# Patient Record
Sex: MALE | Race: WHITE | Employment: OTHER | URBAN - METROPOLITAN AREA
[De-identification: names, ages, dates, MRNs, and addresses within clinical notes are randomized per-mention and may not be internally consistent; named-entity substitution may affect disease eponyms.]

---

## 2017-08-21 ENCOUNTER — OFFICE VISIT (OUTPATIENT)
Dept: FAMILY MEDICINE CLINIC | Age: 82
End: 2017-08-21

## 2017-08-21 VITALS
SYSTOLIC BLOOD PRESSURE: 130 MMHG | HEIGHT: 70 IN | WEIGHT: 172 LBS | DIASTOLIC BLOOD PRESSURE: 74 MMHG | TEMPERATURE: 98.5 F | BODY MASS INDEX: 24.62 KG/M2

## 2017-08-21 DIAGNOSIS — L03.811 CELLULITIS OF HEAD EXCEPT FACE: ICD-10-CM

## 2017-08-21 PROCEDURE — 99213 OFFICE O/P EST LOW 20 MIN: CPT | Performed by: INTERNAL MEDICINE

## 2017-08-21 RX ORDER — CEPHALEXIN 500 MG/1
500 CAPSULE ORAL 3 TIMES DAILY
Qty: 30 CAPSULE | Refills: 0 | Status: SHIPPED | OUTPATIENT
Start: 2017-08-21 | End: 2022-04-08

## 2017-08-21 ASSESSMENT — PATIENT HEALTH QUESTIONNAIRE - PHQ9
2. FEELING DOWN, DEPRESSED OR HOPELESS: 0
SUM OF ALL RESPONSES TO PHQ9 QUESTIONS 1 & 2: 0
SUM OF ALL RESPONSES TO PHQ QUESTIONS 1-9: 0
1. LITTLE INTEREST OR PLEASURE IN DOING THINGS: 0

## 2017-08-21 ASSESSMENT — ENCOUNTER SYMPTOMS
ABDOMINAL PAIN: 0
SHORTNESS OF BREATH: 0
COUGH: 0
APNEA: 0

## 2022-04-08 ENCOUNTER — APPOINTMENT (OUTPATIENT)
Dept: CT IMAGING | Age: 87
End: 2022-04-08
Payer: MEDICARE

## 2022-04-08 ENCOUNTER — HOSPITAL ENCOUNTER (EMERGENCY)
Age: 87
Discharge: HOME | End: 2022-04-08
Attending: EMERGENCY MEDICINE
Payer: MEDICARE

## 2022-04-08 VITALS
OXYGEN SATURATION: 99 % | HEIGHT: 70 IN | BODY MASS INDEX: 23.41 KG/M2 | DIASTOLIC BLOOD PRESSURE: 84 MMHG | TEMPERATURE: 98.5 F | HEART RATE: 88 BPM | SYSTOLIC BLOOD PRESSURE: 206 MMHG | RESPIRATION RATE: 18 BRPM | WEIGHT: 163.5 LBS

## 2022-04-08 DIAGNOSIS — S09.90XA CLOSED HEAD INJURY, INITIAL ENCOUNTER: Primary | ICD-10-CM
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
## 2022-04-08 DIAGNOSIS — S51.011A SKIN TEAR OF RIGHT ELBOW WITHOUT COMPLICATION, INITIAL ENCOUNTER: ICD-10-CM

## 2022-04-08 PROCEDURE — 70450 CT HEAD/BRAIN W/O DYE: CPT

## 2022-04-08 PROCEDURE — 99284 EMERGENCY DEPT VISIT MOD MDM: CPT

## 2022-04-08 ASSESSMENT — PAIN DESCRIPTION - LOCATION: LOCATION: ARM

## 2022-04-08 ASSESSMENT — PAIN DESCRIPTION - PAIN TYPE: TYPE: ACUTE PAIN

## 2022-04-08 ASSESSMENT — PAIN DESCRIPTION - ORIENTATION: ORIENTATION: RIGHT

## 2022-04-08 ASSESSMENT — PAIN DESCRIPTION - FREQUENCY: FREQUENCY: CONTINUOUS

## 2022-04-08 ASSESSMENT — PAIN SCALES - GENERAL
PAINLEVEL_OUTOF10: 0
PAINLEVEL_OUTOF10: 3

## 2022-04-08 ASSESSMENT — PAIN - FUNCTIONAL ASSESSMENT
PAIN_FUNCTIONAL_ASSESSMENT: ACTIVITIES ARE NOT PREVENTED
PAIN_FUNCTIONAL_ASSESSMENT: 0-10
PAIN_FUNCTIONAL_ASSESSMENT: 0-10

## 2022-04-08 ASSESSMENT — PAIN DESCRIPTION - DESCRIPTORS: DESCRIPTORS: TENDER

## 2022-04-08 ASSESSMENT — PAIN DESCRIPTION - PROGRESSION: CLINICAL_PROGRESSION: NOT CHANGED

## 2022-04-08 NOTE — ED PROVIDER NOTES
Emergency Physician Note        Note Open Time: 7:08 PM EDT    Chief Complaint  Laceration (Patient lost his balance at home and sustained 2 large skin tears on his right arm. Also hit his head and has a small abrasion on the right fore head. No LOC. AMbulatory with strong gait using his walker.)       History of Present Illness  Elpidio Chang is a 80 y.o. male who presents to the ED for laceration. Patient reports that he had a slip and fall at home with no loss of consciousness. He did strike his head. He also injured his right arm. He has no particular pain complaints. Tetanus is up-to-date. He does have a large skin tear of his right arm which she wanted to have evaluated and treated. 10 systems reviewed, pertinent positives per HPI otherwise noted to be negative    I have reviewed the following from the nursing documentation:      Prior to Admission medications    Medication Sig Start Date End Date Taking? Authorizing Provider   tamsulosin (FLOMAX) 0.4 MG capsule Take 0.4 mg by mouth daily. Historical Provider, MD   lisinopril (PRINIVIL;ZESTRIL) 20 MG tablet Take 20 mg by mouth daily. Historical Provider, MD   simvastatin (ZOCOR) 80 MG tablet Take 1/2 (one half) by mouth daily    Historical Provider, MD   metoprolol (LOPRESSOR) 25 MG tablet Take 25 mg by mouth 2 times daily. Historical Provider, MD   hydrochlorothiazide (HYDRODIURIL) 25 MG tablet Take 25 mg by mouth daily.       Historical Provider, MD   aspirin 81 MG EC tablet Take 2 by mouth daily    Historical Provider, MD       Allergies as of 04/08/2022    (No Known Allergies)       Past Medical History:   Diagnosis Date    Hyperlipidemia     Hypertension         Surgical History:   Past Surgical History:   Procedure Laterality Date    CATARACT REMOVAL  2010    bilateral/Dr. Desai Miss    CYST REMOVAL      knee    JOINT REPLACEMENT      right knee    KNEE ARTHROSCOPY      right    PRE-MALIGNANT / BENIGN SKIN LESION EXCISION  2010    benign pilomatrixoma- chest wall        Family History:    Family History   Problem Relation Age of Onset    Diabetes Mother     Stroke Mother     Heart Disease Sister     Heart Disease Brother     Stroke Brother        Social History     Socioeconomic History    Marital status:      Spouse name: Not on file    Number of children: Not on file    Years of education: Not on file    Highest education level: Not on file   Occupational History    Not on file   Tobacco Use    Smoking status: Former Smoker     Types: Cigarettes    Smokeless tobacco: Never Used   Substance and Sexual Activity    Alcohol use: Yes     Comment: occasional use    Drug use: No    Sexual activity: Not on file   Other Topics Concern    Not on file   Social History Narrative    Not on file     Social Determinants of Health     Financial Resource Strain:     Difficulty of Paying Living Expenses: Not on file   Food Insecurity:     Worried About [address] in the Last Year: Not on file    [address] [employer] in the Last Year: Not on file   Transportation Needs:     Lack of Transportation (Medical): Not on file    Lack of Transportation (Non-Medical):  Not on file   Physical Activity:     Days of Exercise per Week: Not on file    Minutes of Exercise per Session: Not on file   Stress:     Feeling of Stress : Not on file   Social Connections:     Frequency of Communication with Friends and Family: Not on file    Frequency of Social Gatherings with Friends and Family: Not on file    Attends Sabianist Services: Not on file    Active Member of Clubs or Organizations: Not on file    Attends Club or Organization Meetings: Not on file    Marital Status: Not on file   Intimate Partner Violence:     Fear of Current or Ex-Partner: Not on file    Emotionally Abused: Not on file    Physically Abused: Not on file    Sexually Abused: Not on file   Housing Stability:     Unable to Pay for Housing in the Last Year: Not on file    Number of Places Lived in the Last Year: Not on file    Unstable Housing in the Last Year: Not on file       Nursing notes reviewed. ED Triage Vitals [04/08/22 1845]   Enc Vitals Group      BP (!) 213/75      Pulse 87      Resp 19      Temp 98.5 °F (36.9 °C)      Temp Source Tympanic      SpO2 100 %      Weight 163 lb 8 oz (74.2 kg)      Height 5' 10\" (1.778 m)      Head Circumference       Peak Flow       Pain Score       Pain Loc       Pain Edu? Excl. in [address]? GENERAL:  Awake, alert. Well developed, well nourished with no apparent distress. HENT:  Normocephalic, right frontal forehead abrasion with contusion noted, no depressed skull fracture, moist mucous membranes. EYES:  Pupils equal round and reactive to light, Conjunctiva normal, extraocular movements normal.  NECK:  No meningeal signs, Supple. No tenderness. CHEST:  Regular rate and rhythm, chest wall non-tender. LUNGS:  Clear to auscultation bilaterally. ABDOMEN:  Soft, non-tender, no rebound, rigidity or guarding, non-distended, normal bowel sounds. No costovertebral angle tenderness to palpation. BACK:  No tenderness. .rmsp  EXTREMITIES:  Normal range of motion, no edema, no bony tenderness, no deformity, distal pulses present. Remainder of axial and appendicular skeleton NT exc as noted. Full active ROM as well at all jts exc as noted. SKIN: Warm, dry and large skin tears right upper extremity from about the mid humerus dorsally and then over the elbow to the mid forearm. NEUROLOGIC: Normal mental status. Moving all extremities to command. RADIOLOGY  X-RAYS:  I have reviewed radiologic plain film image(s). ALL OTHER NON-PLAIN FILM IMAGES SUCH AS CT, ULTRASOUND AND MRI HAVE BEEN READ BY THE RADIOLOGIST.   CT Head WO Contrast    (Results Pending)        MEDICAL DECISION MAKING          7:09 PM: I discussed the history, physical, and treatment plan with Dr. Aruna Lake. Mirna Ledger was signed out in stable condition. Please see Dr. Jonathan Patel note for further details, including diagnosis and disposition. Clinical Impression    1. Closed head injury, initial encounter    2. Skin tear of right elbow without complication, initial encounter    3. Fall from slip, trip, or stumble, initial encounter        Blood pressure (!) 208/91, pulse 87, temperature 98.5 °F (36.9 °C), temperature source Tympanic, resp. rate 19, height 5' 10\" (1.778 m), weight 163 lb 8 oz (74.2 kg), SpO2 100 %. This chart was generated using the [address] [place] [address] dictation system. I created this record but it may contain dictation errors.           Solitario Voss MD  04/08/22 1911

## 2022-04-08 NOTE — ED PROVIDER NOTES
Emergency Department Encounter  Location: [address] [place] [address]    Patient: Teresa Kirk  MRN: [id number]  : 1932  Date of evaluation: 2022  ED Provider: Chika Barbosa MD    7:00p.m. Teresa Kirk was checked out to me by Dr. Idalia Paredes. Please see his/her initial documentation for details of the patient's initial ED presentation, physical exam and completed studies. In brief, Teresa Kirk is a 80 y.o. male that presented to the emergency department with a fall at home. Patient apparently lost his balance and landed on the right arm sustaining multiple laceration and skin tear. He also has a hematoma on his forehead. There was no loss of consciousness. Patient is ambulatory with strong gait using his walker. Has past history of hypertension hyperlipidemia anemia. Patient is alert awake. On exam wise he is has a soft tissue hematoma noted. Pupils are equal and reactive. Moving all extremities well. No deformity. Right arm has extensive large skin tear from the upper arm extending to the forearm. Probably measures about 10 inches. I have reviewed and interpreted all of the currently available lab results and diagnostics from this visit:  No results found for this visit on 22. CT Head WO Contrast    Result Date: 2022  EXAMINATION: CT OF THE HEAD WITHOUT CONTRAST  2022 7:06 pm TECHNIQUE: CT of the head was performed without the administration of intravenous contrast. Dose modulation, iterative reconstruction, and/or weight based adjustment of the mA/kV was utilized to reduce the radiation dose to as low as reasonably achievable. COMPARISON: None. HISTORY: ORDERING SYSTEM PROVIDED HISTORY: head injury TECHNOLOGIST PROVIDED HISTORY: Reason for exam:->head injury Has a \"code stroke\" or \"stroke alert\" been called? ->No Decision Support Exception - unselect if not a suspected or confirmed emergency medical condition->Emergency Medical Condition (MA) Reason for Exam: Fall, head lac to rt forehead, denies loc x 1 hr FINDINGS: BRAIN/VENTRICLES: There is no acute intracranial hemorrhage, mass effect or midline shift. No abnormal extra-axial fluid collection. The gray-white differentiation is maintained without evidence of an acute infarct. There is no evidence of hydrocephalus. Moderate diffuse decrease in cerebral volume is noted with corresponding prominence of the sulci and ventricles. Ill-defined hypoattenuation is noted within cerebral white matter consistent with moderate microvascular ischemic disease. ORBITS: The visualized portion of the orbits demonstrate no acute abnormality. SINUSES: The visualized paranasal sinuses and mastoid air cells demonstrate no acute abnormality. SOFT TISSUES/SKULL:  Anterior right frontal scalp mild soft tissue contusion is present. 1. Anterior right frontal scalp mild focal soft tissue contusion. 2. No associated acute intracranial abnormality. 3. Moderate cerebral white matter chronic microvascular ischemic disease. 4. Moderate diffuse cerebral atrophy. Final ED Course and MDM: In brief, Mirna Alvares is a 80 y.o. male whose care was signed out to me by the outgoing provider. In brief, elderly 80 male who lost his balance and fell hit his head without loss of consciousness. Patient underwent a CT scan which was negative. The skin tear was dressed. It was cleaned. Tetanus status up-to-date. The area was cleaned by the nurse. Dressing applied. Most likely Steri-Strip. CAT scan explained to the patient the results. Has a soft tissue hematoma. Patient reassured and discharged in good condition. Ambulatory. ED Medication Orders (From admission, onward)    None          Final Impression      1. Closed head injury, initial encounter    2. Skin tear of right elbow without complication, initial encounter    3.  Fall from slip, trip, or stumble, initial encounter        DISPOSITION       (Please note that portions of this note may have been completed with a voice recognition program. Efforts were made to edit the dictations but occasionally words are mis-transcribed.)    DANIELE OWUSU MD  [address], [place]  04/08/22 5502

## 2022-04-09 NOTE — ED NOTES
Right arm skin tears/ cleansed w large amounts hibiclens and saline/ tolerated well/ MD Iqbal into apply special dressings/ skin steri stripped and wrapped w 4x4 and coban/ extra supplies and wound care instructions given to pt and family. Bleeding controlled and drsg dry and intact.       Priya Simmons, RN  04/08/22 2121

## 2023-01-11 ENCOUNTER — HOSPITAL ENCOUNTER (OUTPATIENT)
Dept: CT IMAGING | Age: 88
Discharge: HOME | End: 2023-01-11
Payer: MEDICARE

## 2023-01-11 DIAGNOSIS — R10.32 LLQ PAIN: ICD-10-CM

## 2023-01-11 PROCEDURE — 74176 CT ABD & PELVIS W/O CONTRAST: CPT

## 2025-01-15 ENCOUNTER — HOSPITAL ENCOUNTER (INPATIENT)
Age: 89
LOS: 6 days | Discharge: NURSING, REHAB OR LONG-TERM CARE | DRG: 643 | End: 2025-01-22
Attending: EMERGENCY MEDICINE | Admitting: INTERNAL MEDICINE
Payer: MEDICARE

## 2025-01-15 ENCOUNTER — APPOINTMENT (OUTPATIENT)
Dept: GENERAL RADIOLOGY | Age: 89
DRG: 643 | End: 2025-01-15
Payer: MEDICARE

## 2025-01-15 DIAGNOSIS — T68.XXXA HYPOTHERMIA, INITIAL ENCOUNTER: ICD-10-CM

## 2025-01-15 DIAGNOSIS — I50.9 CONGESTIVE HEART FAILURE, UNSPECIFIED HF CHRONICITY, UNSPECIFIED HEART FAILURE TYPE (HCC): ICD-10-CM

## 2025-01-15 DIAGNOSIS — E03.9 MYXEDEMA: Primary | ICD-10-CM

## 2025-01-15 DIAGNOSIS — R79.89 ELEVATED TSH: ICD-10-CM

## 2025-01-15 DIAGNOSIS — E87.1 HYPONATREMIA: ICD-10-CM

## 2025-01-15 LAB
ANION GAP SERPL CALCULATED.3IONS-SCNC: 14 MMOL/L (ref 3–16)
BASE EXCESS BLDV CALC-SCNC: -5.5 MMOL/L (ref -2–3)
BASOPHILS # BLD: 0 K/UL (ref 0–0.2)
BASOPHILS NFR BLD: 0.3 %
BUN SERPL-MCNC: 66 MG/DL (ref 7–20)
CALCIUM SERPL-MCNC: 8.8 MG/DL (ref 8.3–10.6)
CHLORIDE SERPL-SCNC: 100 MMOL/L (ref 99–110)
CO2 BLDV-SCNC: 23 MMOL/L
CO2 SERPL-SCNC: 17 MMOL/L (ref 21–32)
COHGB MFR BLDV: 0.9 % (ref 0–1.5)
CREAT SERPL-MCNC: 2.6 MG/DL (ref 0.8–1.3)
DEPRECATED RDW RBC AUTO: 14.9 % (ref 12.4–15.4)
DO-HGB MFR BLDV: 10.8 %
EOSINOPHIL # BLD: 0 K/UL (ref 0–0.6)
EOSINOPHIL NFR BLD: 0.2 %
GFR SERPLBLD CREATININE-BSD FMLA CKD-EPI: 22 ML/MIN/{1.73_M2}
GLUCOSE SERPL-MCNC: 189 MG/DL (ref 70–99)
HCO3 BLDV-SCNC: 21.4 MMOL/L (ref 24–28)
HCT VFR BLD AUTO: 30.8 % (ref 40.5–52.5)
HGB BLD-MCNC: 10 G/DL (ref 13.5–17.5)
LYMPHOCYTES # BLD: 0.9 K/UL (ref 1–5.1)
LYMPHOCYTES NFR BLD: 9 %
MAGNESIUM SERPL-MCNC: 3.28 MG/DL (ref 1.8–2.4)
MCH RBC QN AUTO: 31.6 PG (ref 26–34)
MCHC RBC AUTO-ENTMCNC: 32.6 G/DL (ref 31–36)
MCV RBC AUTO: 97 FL (ref 80–100)
METHGB MFR BLDV: 0.3 % (ref 0–1.5)
MONOCYTES # BLD: 0.7 K/UL (ref 0–1.3)
MONOCYTES NFR BLD: 7.2 %
NEUTROPHILS # BLD: 8.1 K/UL (ref 1.7–7.7)
NEUTROPHILS NFR BLD: 83.3 %
NT-PROBNP SERPL-MCNC: 2100 PG/ML (ref 0–449)
PCO2 BLDV: 46.9 MMHG (ref 41–51)
PH BLDV: 7.27 [PH] (ref 7.35–7.45)
PHOSPHATE SERPL-MCNC: 5.2 MG/DL (ref 2.5–4.9)
PLATELET # BLD AUTO: 209 K/UL (ref 135–450)
PMV BLD AUTO: 9.9 FL (ref 5–10.5)
PO2 BLDV: 63.2 MMHG (ref 25–40)
POTASSIUM SERPL-SCNC: ABNORMAL MMOL/L (ref 3.5–5.1)
RBC # BLD AUTO: 3.17 M/UL (ref 4.2–5.9)
SAO2 % BLDV: 89 %
SODIUM SERPL-SCNC: 131 MMOL/L (ref 136–145)
TROPONIN, HIGH SENSITIVITY: 70 NG/L (ref 0–22)
TSH SERPL DL<=0.005 MIU/L-ACNC: 16 UIU/ML (ref 0.27–4.2)
WBC # BLD AUTO: 9.7 K/UL (ref 4–11)

## 2025-01-15 PROCEDURE — 82533 TOTAL CORTISOL: CPT

## 2025-01-15 PROCEDURE — 84145 PROCALCITONIN (PCT): CPT

## 2025-01-15 PROCEDURE — 2580000003 HC RX 258: Performed by: EMERGENCY MEDICINE

## 2025-01-15 PROCEDURE — 99285 EMERGENCY DEPT VISIT HI MDM: CPT

## 2025-01-15 PROCEDURE — 84132 ASSAY OF SERUM POTASSIUM: CPT

## 2025-01-15 PROCEDURE — 82803 BLOOD GASES ANY COMBINATION: CPT

## 2025-01-15 PROCEDURE — 83605 ASSAY OF LACTIC ACID: CPT

## 2025-01-15 PROCEDURE — 84484 ASSAY OF TROPONIN QUANT: CPT

## 2025-01-15 PROCEDURE — 36415 COLL VENOUS BLD VENIPUNCTURE: CPT

## 2025-01-15 PROCEDURE — 71045 X-RAY EXAM CHEST 1 VIEW: CPT

## 2025-01-15 PROCEDURE — 84100 ASSAY OF PHOSPHORUS: CPT

## 2025-01-15 PROCEDURE — 84439 ASSAY OF FREE THYROXINE: CPT

## 2025-01-15 PROCEDURE — 87040 BLOOD CULTURE FOR BACTERIA: CPT

## 2025-01-15 PROCEDURE — 80048 BASIC METABOLIC PNL TOTAL CA: CPT

## 2025-01-15 PROCEDURE — 85025 COMPLETE CBC W/AUTO DIFF WBC: CPT

## 2025-01-15 PROCEDURE — 83880 ASSAY OF NATRIURETIC PEPTIDE: CPT

## 2025-01-15 PROCEDURE — 81001 URINALYSIS AUTO W/SCOPE: CPT

## 2025-01-15 PROCEDURE — 93005 ELECTROCARDIOGRAM TRACING: CPT | Performed by: EMERGENCY MEDICINE

## 2025-01-15 PROCEDURE — 84443 ASSAY THYROID STIM HORMONE: CPT

## 2025-01-15 PROCEDURE — 83735 ASSAY OF MAGNESIUM: CPT

## 2025-01-15 RX ORDER — LUBIPROSTONE 24 UG/1
24 CAPSULE ORAL EVERY MORNING
Status: ON HOLD | COMMUNITY
Start: 2024-12-03 | End: 2025-01-22 | Stop reason: HOSPADM

## 2025-01-15 RX ORDER — ACETAMINOPHEN 325 MG/1
650 TABLET ORAL EVERY 6 HOURS PRN
COMMUNITY
Start: 2024-12-03

## 2025-01-15 RX ORDER — 0.9 % SODIUM CHLORIDE 0.9 %
500 INTRAVENOUS SOLUTION INTRAVENOUS ONCE
Status: COMPLETED | OUTPATIENT
Start: 2025-01-15 | End: 2025-01-16

## 2025-01-15 RX ORDER — 0.9 % SODIUM CHLORIDE 0.9 %
1000 INTRAVENOUS SOLUTION INTRAVENOUS ONCE
Status: COMPLETED | OUTPATIENT
Start: 2025-01-15 | End: 2025-01-16

## 2025-01-15 RX ORDER — LIOTHYRONINE SODIUM 10 UG/ML
5 INJECTION, SOLUTION INTRAVENOUS ONCE
Status: COMPLETED | OUTPATIENT
Start: 2025-01-16 | End: 2025-01-16

## 2025-01-15 RX ORDER — LANOLIN ALCOHOL/MO/W.PET/CERES
1000 CREAM (GRAM) TOPICAL DAILY
COMMUNITY
Start: 2024-12-04

## 2025-01-15 RX ORDER — SODIUM CHLORIDE 9 MG/ML
5 INJECTION, SOLUTION INTRAMUSCULAR; INTRAVENOUS; SUBCUTANEOUS ONCE
Status: COMPLETED | OUTPATIENT
Start: 2025-01-16 | End: 2025-01-16

## 2025-01-15 RX ORDER — METOPROLOL SUCCINATE 50 MG/1
50 TABLET, EXTENDED RELEASE ORAL DAILY
Status: ON HOLD | COMMUNITY
Start: 2025-01-06 | End: 2025-01-22 | Stop reason: HOSPADM

## 2025-01-15 RX ORDER — ATORVASTATIN CALCIUM 40 MG/1
40 TABLET, FILM COATED ORAL NIGHTLY
Status: ON HOLD | COMMUNITY
Start: 2024-12-03 | End: 2025-01-22 | Stop reason: HOSPADM

## 2025-01-15 RX ORDER — LOPERAMIDE HYDROCHLORIDE 2 MG/1
2 CAPSULE ORAL 4 TIMES DAILY PRN
Status: ON HOLD | COMMUNITY
End: 2025-01-22 | Stop reason: HOSPADM

## 2025-01-15 RX ORDER — LEVOTHYROXINE SODIUM ANHYDROUS 100 UG/5ML
200 INJECTION, POWDER, LYOPHILIZED, FOR SOLUTION INTRAVENOUS ONCE
Status: COMPLETED | OUTPATIENT
Start: 2025-01-16 | End: 2025-01-16

## 2025-01-15 RX ORDER — NIFEDIPINE 30 MG/1
30 TABLET, EXTENDED RELEASE ORAL NIGHTLY
COMMUNITY
Start: 2024-12-04

## 2025-01-15 RX ORDER — AMIODARONE HYDROCHLORIDE 100 MG/1
100 TABLET ORAL DAILY
Status: ON HOLD | COMMUNITY
Start: 2024-12-04 | End: 2025-01-22 | Stop reason: HOSPADM

## 2025-01-15 RX ADMIN — SODIUM CHLORIDE 500 ML: 9 INJECTION, SOLUTION INTRAVENOUS at 23:15

## 2025-01-15 ASSESSMENT — LIFESTYLE VARIABLES
HOW MANY STANDARD DRINKS CONTAINING ALCOHOL DO YOU HAVE ON A TYPICAL DAY: PATIENT DOES NOT DRINK
HOW OFTEN DO YOU HAVE A DRINK CONTAINING ALCOHOL: NEVER

## 2025-01-15 ASSESSMENT — PAIN - FUNCTIONAL ASSESSMENT: PAIN_FUNCTIONAL_ASSESSMENT: NONE - DENIES PAIN

## 2025-01-16 PROBLEM — E03.5 HYPOTHYROID COMA (HCC): Status: ACTIVE | Noted: 2025-01-16

## 2025-01-16 LAB
BASOPHILS # BLD: 0 K/UL (ref 0–0.2)
BASOPHILS NFR BLD: 0.1 %
BILIRUB UR QL STRIP.AUTO: NEGATIVE
CLARITY UR: CLEAR
COLOR UR: YELLOW
CORTIS SERPL-MCNC: 17.2 UG/DL
DEPRECATED RDW RBC AUTO: 14.6 % (ref 12.4–15.4)
EKG DIAGNOSIS: NORMAL
EKG Q-T INTERVAL: 564 MS
EKG QRS DURATION: 158 MS
EKG QTC CALCULATION (BAZETT): 529 MS
EKG R AXIS: 77 DEGREES
EKG T AXIS: -18 DEGREES
EKG VENTRICULAR RATE: 53 BPM
EOSINOPHIL # BLD: 0 K/UL (ref 0–0.6)
EOSINOPHIL NFR BLD: 0.1 %
GLUCOSE UR STRIP.AUTO-MCNC: NEGATIVE MG/DL
HCT VFR BLD AUTO: 25.9 % (ref 40.5–52.5)
HGB BLD-MCNC: 8.6 G/DL (ref 13.5–17.5)
HGB UR QL STRIP.AUTO: NEGATIVE
KETONES UR STRIP.AUTO-MCNC: NEGATIVE MG/DL
LACTATE BLDV-SCNC: 1.2 MMOL/L (ref 0.4–2)
LACTATE BLDV-SCNC: 3.8 MMOL/L (ref 0.4–2)
LEUKOCYTE ESTERASE UR QL STRIP.AUTO: NEGATIVE
LYMPHOCYTES # BLD: 0.4 K/UL (ref 1–5.1)
LYMPHOCYTES NFR BLD: 5.1 %
MCH RBC QN AUTO: 31.7 PG (ref 26–34)
MCHC RBC AUTO-ENTMCNC: 33 G/DL (ref 31–36)
MCV RBC AUTO: 95.9 FL (ref 80–100)
MONOCYTES # BLD: 0.3 K/UL (ref 0–1.3)
MONOCYTES NFR BLD: 4.7 %
NEUTROPHILS # BLD: 6.6 K/UL (ref 1.7–7.7)
NEUTROPHILS NFR BLD: 90 %
NITRITE UR QL STRIP.AUTO: NEGATIVE
PH UR STRIP.AUTO: 6 [PH] (ref 5–8)
PLATELET # BLD AUTO: 164 K/UL (ref 135–450)
PMV BLD AUTO: 9.5 FL (ref 5–10.5)
POTASSIUM SERPL-SCNC: NORMAL MMOL/L (ref 3.5–5.1)
PROCALCITONIN SERPL IA-MCNC: 0.05 NG/ML (ref 0–0.15)
PROT UR STRIP.AUTO-MCNC: 30 MG/DL
RBC # BLD AUTO: 2.7 M/UL (ref 4.2–5.9)
RBC #/AREA URNS HPF: ABNORMAL /HPF (ref 0–4)
SP GR UR STRIP.AUTO: 1.02 (ref 1–1.03)
T4 FREE SERPL-MCNC: 1 NG/DL (ref 0.9–1.8)
TROPONIN, HIGH SENSITIVITY: 68 NG/L (ref 0–22)
UA DIPSTICK W REFLEX MICRO PNL UR: YES
URN SPEC COLLECT METH UR: ABNORMAL
UROBILINOGEN UR STRIP-ACNC: 0.2 E.U./DL
WBC # BLD AUTO: 7.3 K/UL (ref 4–11)
WBC #/AREA URNS HPF: ABNORMAL /HPF (ref 0–5)

## 2025-01-16 PROCEDURE — 85025 COMPLETE CBC W/AUTO DIFF WBC: CPT

## 2025-01-16 PROCEDURE — 51702 INSERT TEMP BLADDER CATH: CPT

## 2025-01-16 PROCEDURE — 92610 EVALUATE SWALLOWING FUNCTION: CPT

## 2025-01-16 PROCEDURE — 6360000002 HC RX W HCPCS: Performed by: EMERGENCY MEDICINE

## 2025-01-16 PROCEDURE — 6360000002 HC RX W HCPCS: Performed by: INTERNAL MEDICINE

## 2025-01-16 PROCEDURE — 2500000003 HC RX 250 WO HCPCS: Performed by: INTERNAL MEDICINE

## 2025-01-16 PROCEDURE — 99221 1ST HOSP IP/OBS SF/LOW 40: CPT | Performed by: NURSE PRACTITIONER

## 2025-01-16 PROCEDURE — 6370000000 HC RX 637 (ALT 250 FOR IP): Performed by: NURSE PRACTITIONER

## 2025-01-16 PROCEDURE — 96374 THER/PROPH/DIAG INJ IV PUSH: CPT

## 2025-01-16 PROCEDURE — 83605 ASSAY OF LACTIC ACID: CPT

## 2025-01-16 PROCEDURE — 1200000000 HC SEMI PRIVATE

## 2025-01-16 PROCEDURE — 6370000000 HC RX 637 (ALT 250 FOR IP): Performed by: INTERNAL MEDICINE

## 2025-01-16 PROCEDURE — 2580000003 HC RX 258: Performed by: INTERNAL MEDICINE

## 2025-01-16 PROCEDURE — 2500000003 HC RX 250 WO HCPCS: Performed by: EMERGENCY MEDICINE

## 2025-01-16 PROCEDURE — 2580000003 HC RX 258: Performed by: EMERGENCY MEDICINE

## 2025-01-16 PROCEDURE — 36415 COLL VENOUS BLD VENIPUNCTURE: CPT

## 2025-01-16 RX ORDER — SODIUM CHLORIDE 0.9 % (FLUSH) 0.9 %
5-40 SYRINGE (ML) INJECTION EVERY 12 HOURS SCHEDULED
Status: DISCONTINUED | OUTPATIENT
Start: 2025-01-16 | End: 2025-01-22 | Stop reason: HOSPADM

## 2025-01-16 RX ORDER — LEVOTHYROXINE SODIUM ANHYDROUS 100 UG/5ML
50 INJECTION, POWDER, LYOPHILIZED, FOR SOLUTION INTRAVENOUS DAILY
Status: DISCONTINUED | OUTPATIENT
Start: 2025-01-16 | End: 2025-01-18

## 2025-01-16 RX ORDER — CASTOR OIL AND BALSAM, PERU 788; 87 MG/G; MG/G
OINTMENT TOPICAL 2 TIMES DAILY
Status: DISCONTINUED | OUTPATIENT
Start: 2025-01-16 | End: 2025-01-22 | Stop reason: HOSPADM

## 2025-01-16 RX ORDER — SODIUM CHLORIDE 0.9 % (FLUSH) 0.9 %
5-40 SYRINGE (ML) INJECTION PRN
Status: DISCONTINUED | OUTPATIENT
Start: 2025-01-16 | End: 2025-01-22 | Stop reason: HOSPADM

## 2025-01-16 RX ORDER — ACETAMINOPHEN 325 MG/1
650 TABLET ORAL EVERY 6 HOURS PRN
Status: DISCONTINUED | OUTPATIENT
Start: 2025-01-16 | End: 2025-01-22 | Stop reason: HOSPADM

## 2025-01-16 RX ORDER — SODIUM CHLORIDE 9 MG/ML
INJECTION, SOLUTION INTRAVENOUS CONTINUOUS
Status: ACTIVE | OUTPATIENT
Start: 2025-01-16 | End: 2025-01-17

## 2025-01-16 RX ORDER — ONDANSETRON 2 MG/ML
4 INJECTION INTRAMUSCULAR; INTRAVENOUS EVERY 6 HOURS PRN
Status: DISCONTINUED | OUTPATIENT
Start: 2025-01-16 | End: 2025-01-22 | Stop reason: HOSPADM

## 2025-01-16 RX ORDER — SODIUM CHLORIDE 9 MG/ML
5 INJECTION, SOLUTION INTRAMUSCULAR; INTRAVENOUS; SUBCUTANEOUS DAILY
Status: DISCONTINUED | OUTPATIENT
Start: 2025-01-16 | End: 2025-01-22 | Stop reason: HOSPADM

## 2025-01-16 RX ORDER — POLYETHYLENE GLYCOL 3350 17 G/17G
17 POWDER, FOR SOLUTION ORAL DAILY PRN
Status: DISCONTINUED | OUTPATIENT
Start: 2025-01-16 | End: 2025-01-22 | Stop reason: HOSPADM

## 2025-01-16 RX ORDER — ONDANSETRON 4 MG/1
4 TABLET, ORALLY DISINTEGRATING ORAL EVERY 8 HOURS PRN
Status: DISCONTINUED | OUTPATIENT
Start: 2025-01-16 | End: 2025-01-22 | Stop reason: HOSPADM

## 2025-01-16 RX ORDER — SODIUM CHLORIDE 9 MG/ML
INJECTION, SOLUTION INTRAVENOUS PRN
Status: DISCONTINUED | OUTPATIENT
Start: 2025-01-16 | End: 2025-01-22 | Stop reason: HOSPADM

## 2025-01-16 RX ORDER — ACETAMINOPHEN 650 MG/1
650 SUPPOSITORY RECTAL EVERY 6 HOURS PRN
Status: DISCONTINUED | OUTPATIENT
Start: 2025-01-16 | End: 2025-01-22 | Stop reason: HOSPADM

## 2025-01-16 RX ORDER — BISACODYL 10 MG
10 SUPPOSITORY, RECTAL RECTAL DAILY
Status: DISCONTINUED | OUTPATIENT
Start: 2025-01-16 | End: 2025-01-22 | Stop reason: HOSPADM

## 2025-01-16 RX ADMIN — SODIUM CHLORIDE, PRESERVATIVE FREE 10 ML: 5 INJECTION INTRAVENOUS at 23:31

## 2025-01-16 RX ADMIN — LEVOTHYROXINE SODIUM ANHYDROUS 50 MCG: 100 INJECTION, POWDER, LYOPHILIZED, FOR SOLUTION INTRAVENOUS at 09:22

## 2025-01-16 RX ADMIN — Medication: at 15:54

## 2025-01-16 RX ADMIN — PIPERACILLIN SODIUM AND TAZOBACTAM SODIUM 4500 MG: 4; .5 INJECTION, SOLUTION INTRAVENOUS at 06:43

## 2025-01-16 RX ADMIN — SODIUM CHLORIDE: 9 INJECTION, SOLUTION INTRAVENOUS at 02:22

## 2025-01-16 RX ADMIN — LIOTHYRONINE SODIUM 5 MCG: 10 INJECTION, SOLUTION INTRAVENOUS at 00:49

## 2025-01-16 RX ADMIN — BISACODYL 10 MG: 10 SUPPOSITORY RECTAL at 10:19

## 2025-01-16 RX ADMIN — SODIUM CHLORIDE, PRESERVATIVE FREE 10 ML: 5 INJECTION INTRAVENOUS at 08:05

## 2025-01-16 RX ADMIN — SODIUM CHLORIDE 5 ML: 9 INJECTION INTRAMUSCULAR; INTRAVENOUS; SUBCUTANEOUS at 00:18

## 2025-01-16 RX ADMIN — Medication: at 23:29

## 2025-01-16 RX ADMIN — SODIUM CHLORIDE, PRESERVATIVE FREE 5 ML: 5 INJECTION INTRAVENOUS at 09:23

## 2025-01-16 RX ADMIN — SODIUM CHLORIDE 25 ML: 9 INJECTION, SOLUTION INTRAVENOUS at 06:42

## 2025-01-16 RX ADMIN — LEVOTHYROXINE SODIUM ANHYDROUS 200 MCG: 100 INJECTION, POWDER, LYOPHILIZED, FOR SOLUTION INTRAVENOUS at 00:18

## 2025-01-16 RX ADMIN — SODIUM CHLORIDE 25 ML: 9 INJECTION, SOLUTION INTRAVENOUS at 23:28

## 2025-01-16 RX ADMIN — SODIUM CHLORIDE: 9 INJECTION, SOLUTION INTRAVENOUS at 15:53

## 2025-01-16 RX ADMIN — PIPERACILLIN AND TAZOBACTAM 3375 MG: 3; .375 INJECTION, POWDER, LYOPHILIZED, FOR SOLUTION INTRAVENOUS at 23:29

## 2025-01-16 RX ADMIN — PIPERACILLIN AND TAZOBACTAM 3375 MG: 3; .375 INJECTION, POWDER, LYOPHILIZED, FOR SOLUTION INTRAVENOUS at 11:59

## 2025-01-16 RX ADMIN — SODIUM CHLORIDE 1000 ML: 0.9 INJECTION, SOLUTION INTRAVENOUS at 00:03

## 2025-01-16 NOTE — CONSULTS
The [facility]  Palliative Medicine Consultation Note      Date Of Admission:1/15/2025  Date of consult: 01/16/25  Seen by PC in the past:  Yes     Recommendations:        Introduced palliative care and had a voluntary discussion about advance care planning to daughter Citlali. Citlali stated her mother, Komal has mild dementia but is able to grasp the situation. Pt has six children, Citlali is the oldest. Citlali states she has HCPOA that she will bring in today. Citlali stated since November 8 when he had the hospitalization for his stroke, patient's overall quality of life has declined. He required acute rehab after his hospitalization and then was sent to extended living facility. Per daughter he was overall improving at the extended living facility but since the past few days he started to decline again. Introduced hospice and comfort care measures, she will talk with the family, reevaluate the situation, and will let us know their decision. Provided emotional support to daughter and will follow up tomorrow. D/w Dr. Garcia.    1. Goals of Care/Advanced Care planning/Code status: DNR-CC, confirmed with daughter today. Family will discuss goals of care and hospice amongst themselves and will f/u tomorrow.  2. Pain: Denies any pain.  3. SOB: Feeling short of breath, currently on 2L NC saturating around 95%.  4. Constipation: Belly distended on physical exam, per daughter pt stated he has been constipated and was started on miralax. Ordered dulcolax suppository and prn enema.  5. Disposition: TBD    Reason for Consult:         [x]  Goals of Care  []  Code Status Discussion/Advanced Care Planning   []  Psychosocial/Family Support  []  Symptom Management  []  Other (Specify)    Requesting Physician: Dr. John Henriquez    CHIEF COMPLAINT:  failure to thrive and AMS     History Obtained From:  chart review     History of Present Illness:         John Berg is a 89 y.o. male with PMH of KONG MARTINEZ CVA in

## 2025-01-16 NOTE — ED NOTES
Patient Name: John Berg  : 1932 89 y.o.  MRN: [id number]  ED Room #: 2TR/2TRA-A2     Chief complaint:   Chief Complaint   Patient presents with    Altered Mental Status     Patient to the Er for altered mental status and shortness of breath. Patient unknown when last known well is per facility this started during the day.     Hospital Problem/Diagnosis:   Hospital Problems             Last Modified POA    * (Principal) Hypothyroid coma (HCC) 2025 Yes         O2 Flow Rate:O2 Device: Nasal cannula O2 Flow Rate (L/min): 4 L/min (if applicable)  Cardiac Rhythm:   (if applicable)  Active LDA's:   Peripheral IV 01/15/25 Left Forearm (Active)       Peripheral IV 01/15/25 Left Antecubital (Active)      Urinary Catheter 01/15/25 Rodriguez-Temperature (Active)   $ Urethral catheter insertion $ Not inserted for procedure 01/15/25 234   Urine Appearance Clear 01/15/25 2347   Collection Container Standard 01/15/25 2347   Securement Method Securing device (Describe) 01/15/25 2347   Catheter Care  Perineal wipes 01/15/25 2347   Catheter Best Practices  Bag below bladder 01/15/25 2347          How does patient ambulate? Unknown, did not assess in the Emergency Department    2. How does patient take pills? Unknown, no oral medications were given in the Emergency Department    3. Is patient alert? Drowsy, but responds to voice    4. Is patient oriented? To Person and To Place    5.   Patient arrived from:  nursing home  Facility Name: ___________________________________________    6. If patient is disoriented or from a Skill Nursing Facility has family been notified of admission? Yes    7. Patient belongings? Belongings: Clothing    Disposition of belongings? Kept with Patient     8. Any specific patient or family belongings/needs/dynamics?   a.     9. Miscellaneous comments/pending orders?  a.       If there are any additional questions please reach out to the Emergency Department.      Kelley Torres,

## 2025-01-16 NOTE — ED PROVIDER NOTES
Social Hx,Allergies, and Family Hx were reviewed.         The patient was given the following medications:  Orders Placed This Encounter   Medications    sodium chloride 0.9 % bolus 500 mL    sodium chloride 0.9 % bolus 1,000 mL    AND Linked Order Group     levothyroxine (SYNTHROID) injection 200 mcg      Order Specific Question:   Is levothyroxine IV being used to treat myxedema coma?      Answer:   Yes     sodium chloride (PF) 0.9 % injection 5 mL    liothyronine (TRIOSTAT) injection 5 mcg    sodium chloride flush 0.9 % injection 5-40 mL    sodium chloride flush 0.9 % injection 5-40 mL    0.9 % sodium chloride infusion    OR Linked Order Group     ondansetron (ZOFRAN-ODT) disintegrating tablet 4 mg     ondansetron (ZOFRAN) injection 4 mg    polyethylene glycol (GLYCOLAX) packet 17 g    OR Linked Order Group     acetaminophen (TYLENOL) tablet 650 mg     acetaminophen (TYLENOL) suppository 650 mg    0.9 % sodium chloride infusion       CONSULTS:  None    Review of Systems     Review of Systems   Unable to perform ROS: Mental status change       Past Medical, Surgical, Family, and Social History     He has a past medical history of Anemia, CAD (coronary artery disease), CKD (chronic kidney disease) stage 3, GFR 30-59 ml/min ([facility]), Enlarged prostate, History of CVA (cerebrovascular accident), Hyperlipidemia, Hypertension, NBCCS (nevoid basal cell carcinoma syndrome), and Osteoarthrosis.  He has a past surgical history that includes cyst removal; pre-malignant / benign skin lesion excision (01/01/2010); Cataract removal (01/01/2010); Knee arthroscopy; joint replacement; and Colonoscopy.  His family history includes Diabetes in his mother; Heart Disease in his brother and sister; Stroke in his brother and mother.  He reports that he has quit smoking. His smoking use included cigarettes. He has never used smokeless tobacco. He reports current alcohol use. He reports that he does not use drugs.    Medications

## 2025-01-16 NOTE — H&P
V2.0  History and Physical      Name:  John Berg /Age/Sex: 1932  (89 y.o. male)   MRN & CSN:  [id number] & [id number] Encounter Date/Time: 2025 3:24 AM EST   Location:  [address] [place] [address] PCP: Taurus Washington MD       Hospital Day: 2    Assessment and Plan:   John Berg is a 89 y.o. 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, hypertension, hyperlipidemia, BPH who was recently discharged from [facility] on 2024 and was living in a nursing home and was sent to ER due to failure to thrive, and acute encephalopathy-in the ER patient was found to be hypotensive, hypothermic, and in acute renal failure with hyponatremia-family was contacted who reaffirmed DNR CC status-however they were amenable to medical management and hence patient was admitted    Acute infectious and metabolic encephalopathy due to hyponatremia, acute renal failure due to prerenal acute kidney injury, hypothermia, bradycardia, elevated TSH with normal free T4-present on admission    Overall constellation of symptoms concerning for sepsis  Chest x-ray did not show any acute pulmonary disease  UA was negative for infection  No other imaging was performed  TSH is 16 with normal free T4  Cortisol is normal  White count is normal  IV Zosyn  IV fluids  Admit to PCU with a bear hugger  Repeat labs in the morning  Family history of from DNR CC status    #2 bradycardia with heart rate between 20s and 40s-family has declined pacemaker or any intervention- was given one dose of epinephrine by EMS    Continue cardiac monitoring for now  Admit to PCU  IV Synthroid 50 mcg daily  He was loaded with IV Synthroid 200 mcg and IV liothyroxine 5 mg 1 dose  Stop beta blockers for now and monitor    #3 acute renal failure due to prerenal TE-present on admission    0.9 normal saline at 75 cc an hour  Avoid any nephrotoxic drugs  Repeat labs tomorrow    #4 hyponatremia with sodium of

## 2025-01-16 NOTE — CONSULTS
Clinical Pharmacy Progress Note  Medication History     Admit Date: 1/15/2025    List of current medications patient is taking is complete. Home Medication list in Epic updated to reflect changes noted below.    Source of information: Orders from Assisted Living facility; [facility] paperwork    Current Outpatient Medications   Medication Instructions    acetaminophen (TYLENOL) 650 mg, Oral, EVERY 6 HOURS PRN    amiodarone (PACERONE) 100 mg, Oral, DAILY    aspirin 81 mg, Oral, DAILY    atorvastatin (LIPITOR) 40 mg, Oral, NIGHTLY    loperamide (IMODIUM) 2 mg, Oral, 4 TIMES DAILY PRN, 2 tabs with 1st stool then 1 tab Q6h PRN diarrhea    lubiprostone (AMITIZA) 24 mcg, Oral, EVERY MORNING    magnesium hydroxide (MILK OF MAGNESIA) 400 MG/5ML suspension 30 mLs, Oral, DAILY, May repeat x 1 daily PRN    metoprolol succinate (TOPROL XL) 50 mg, Oral, DAILY    NIFEdipine (PROCARDIA XL) 30 mg, Oral, NIGHTLY    tamsulosin (FLOMAX) 0.4 mg, DAILY    vitamin B-12 (CYANOCOBALAMIN) 1,000 mcg, Oral, DAILY       Please call with questions--  Sherley Luz, PharmD, BCPS  Wireless: [phone]   1/16/2025 12:09 PM

## 2025-01-17 LAB
ALBUMIN SERPL-MCNC: 3.1 G/DL (ref 3.4–5)
ALBUMIN/GLOB SERPL: 1.5 {RATIO} (ref 1.1–2.2)
ALP SERPL-CCNC: 54 U/L (ref 40–129)
ALT SERPL-CCNC: 32 U/L (ref 10–40)
ANION GAP SERPL CALCULATED.3IONS-SCNC: 11 MMOL/L (ref 3–16)
AST SERPL-CCNC: 14 U/L (ref 15–37)
BASOPHILS # BLD: 0 K/UL (ref 0–0.2)
BASOPHILS NFR BLD: 0.2 %
BILIRUB SERPL-MCNC: 0.3 MG/DL (ref 0–1)
BUN SERPL-MCNC: 68 MG/DL (ref 7–20)
CALCIUM SERPL-MCNC: 8 MG/DL (ref 8.3–10.6)
CHLORIDE SERPL-SCNC: 107 MMOL/L (ref 99–110)
CO2 SERPL-SCNC: 20 MMOL/L (ref 21–32)
CREAT SERPL-MCNC: 3 MG/DL (ref 0.8–1.3)
DEPRECATED RDW RBC AUTO: 14.8 % (ref 12.4–15.4)
EOSINOPHIL # BLD: 0 K/UL (ref 0–0.6)
EOSINOPHIL NFR BLD: 0.1 %
GFR SERPLBLD CREATININE-BSD FMLA CKD-EPI: 19 ML/MIN/{1.73_M2}
GLUCOSE SERPL-MCNC: 80 MG/DL (ref 70–99)
HCT VFR BLD AUTO: 25.8 % (ref 40.5–52.5)
HGB BLD-MCNC: 8.6 G/DL (ref 13.5–17.5)
LYMPHOCYTES # BLD: 0.5 K/UL (ref 1–5.1)
LYMPHOCYTES NFR BLD: 7 %
MCH RBC QN AUTO: 31.8 PG (ref 26–34)
MCHC RBC AUTO-ENTMCNC: 33.2 G/DL (ref 31–36)
MCV RBC AUTO: 95.6 FL (ref 80–100)
MONOCYTES # BLD: 0.5 K/UL (ref 0–1.3)
MONOCYTES NFR BLD: 6.8 %
NEUTROPHILS # BLD: 6.4 K/UL (ref 1.7–7.7)
NEUTROPHILS NFR BLD: 85.9 %
PLATELET # BLD AUTO: 165 K/UL (ref 135–450)
PMV BLD AUTO: 9.8 FL (ref 5–10.5)
POTASSIUM SERPL-SCNC: 5.1 MMOL/L (ref 3.5–5.1)
PROT SERPL-MCNC: 5.2 G/DL (ref 6.4–8.2)
RBC # BLD AUTO: 2.7 M/UL (ref 4.2–5.9)
SODIUM SERPL-SCNC: 138 MMOL/L (ref 136–145)
WBC # BLD AUTO: 7.4 K/UL (ref 4–11)

## 2025-01-17 PROCEDURE — 80053 COMPREHEN METABOLIC PANEL: CPT

## 2025-01-17 PROCEDURE — 6360000002 HC RX W HCPCS: Performed by: INTERNAL MEDICINE

## 2025-01-17 PROCEDURE — 2580000003 HC RX 258: Performed by: INTERNAL MEDICINE

## 2025-01-17 PROCEDURE — 6370000000 HC RX 637 (ALT 250 FOR IP): Performed by: NURSE PRACTITIONER

## 2025-01-17 PROCEDURE — 92526 ORAL FUNCTION THERAPY: CPT

## 2025-01-17 PROCEDURE — 36415 COLL VENOUS BLD VENIPUNCTURE: CPT

## 2025-01-17 PROCEDURE — 85025 COMPLETE CBC W/AUTO DIFF WBC: CPT

## 2025-01-17 PROCEDURE — 1200000000 HC SEMI PRIVATE

## 2025-01-17 PROCEDURE — 2500000003 HC RX 250 WO HCPCS: Performed by: INTERNAL MEDICINE

## 2025-01-17 RX ADMIN — LEVOTHYROXINE SODIUM ANHYDROUS 50 MCG: 100 INJECTION, POWDER, LYOPHILIZED, FOR SOLUTION INTRAVENOUS at 09:56

## 2025-01-17 RX ADMIN — SODIUM CHLORIDE, PRESERVATIVE FREE 10 ML: 5 INJECTION INTRAVENOUS at 21:27

## 2025-01-17 RX ADMIN — Medication: at 21:27

## 2025-01-17 RX ADMIN — SODIUM CHLORIDE, PRESERVATIVE FREE 5 ML: 5 INJECTION INTRAVENOUS at 09:56

## 2025-01-17 RX ADMIN — PIPERACILLIN AND TAZOBACTAM 3375 MG: 3; .375 INJECTION, POWDER, LYOPHILIZED, FOR SOLUTION INTRAVENOUS at 11:48

## 2025-01-17 RX ADMIN — SODIUM CHLORIDE, PRESERVATIVE FREE 10 ML: 5 INJECTION INTRAVENOUS at 07:56

## 2025-01-17 RX ADMIN — BISACODYL 10 MG: 10 SUPPOSITORY RECTAL at 11:56

## 2025-01-17 RX ADMIN — PIPERACILLIN AND TAZOBACTAM 3375 MG: 3; .375 INJECTION, POWDER, LYOPHILIZED, FOR SOLUTION INTRAVENOUS at 23:42

## 2025-01-17 RX ADMIN — Medication: at 07:56

## 2025-01-18 LAB
ALBUMIN SERPL-MCNC: 3.1 G/DL (ref 3.4–5)
ALP SERPL-CCNC: 54 U/L (ref 40–129)
ALT SERPL-CCNC: 31 U/L (ref 10–40)
ANION GAP SERPL CALCULATED.3IONS-SCNC: 13 MMOL/L (ref 3–16)
AST SERPL-CCNC: 15 U/L (ref 15–37)
BASOPHILS # BLD: 0 K/UL (ref 0–0.2)
BASOPHILS NFR BLD: 0.5 %
BILIRUB DIRECT SERPL-MCNC: <0.1 MG/DL (ref 0–0.3)
BILIRUB INDIRECT SERPL-MCNC: ABNORMAL MG/DL (ref 0–1)
BILIRUB SERPL-MCNC: <0.2 MG/DL (ref 0–1)
BUN SERPL-MCNC: 62 MG/DL (ref 7–20)
CALCIUM SERPL-MCNC: 8.2 MG/DL (ref 8.3–10.6)
CHLORIDE SERPL-SCNC: 107 MMOL/L (ref 99–110)
CO2 SERPL-SCNC: 19 MMOL/L (ref 21–32)
CREAT SERPL-MCNC: 2.8 MG/DL (ref 0.8–1.3)
DEPRECATED RDW RBC AUTO: 15.5 % (ref 12.4–15.4)
EOSINOPHIL # BLD: 0.1 K/UL (ref 0–0.6)
EOSINOPHIL NFR BLD: 0.7 %
GFR SERPLBLD CREATININE-BSD FMLA CKD-EPI: 21 ML/MIN/{1.73_M2}
GLUCOSE SERPL-MCNC: 70 MG/DL (ref 70–99)
HCT VFR BLD AUTO: 27.4 % (ref 40.5–52.5)
HGB BLD-MCNC: 9 G/DL (ref 13.5–17.5)
LYMPHOCYTES # BLD: 0.7 K/UL (ref 1–5.1)
LYMPHOCYTES NFR BLD: 9.5 %
MAGNESIUM SERPL-MCNC: 2.88 MG/DL (ref 1.8–2.4)
MCH RBC QN AUTO: 31.8 PG (ref 26–34)
MCHC RBC AUTO-ENTMCNC: 32.9 G/DL (ref 31–36)
MCV RBC AUTO: 96.6 FL (ref 80–100)
MONOCYTES # BLD: 0.7 K/UL (ref 0–1.3)
MONOCYTES NFR BLD: 9.7 %
NEUTROPHILS # BLD: 5.9 K/UL (ref 1.7–7.7)
NEUTROPHILS NFR BLD: 79.6 %
NT-PROBNP SERPL-MCNC: 4971 PG/ML (ref 0–449)
PHOSPHATE SERPL-MCNC: 4.7 MG/DL (ref 2.5–4.9)
PLATELET # BLD AUTO: 157 K/UL (ref 135–450)
PMV BLD AUTO: 10.4 FL (ref 5–10.5)
POTASSIUM SERPL-SCNC: 4.3 MMOL/L (ref 3.5–5.1)
PROT SERPL-MCNC: 5.6 G/DL (ref 6.4–8.2)
RBC # BLD AUTO: 2.84 M/UL (ref 4.2–5.9)
SODIUM SERPL-SCNC: 139 MMOL/L (ref 136–145)
WBC # BLD AUTO: 7.5 K/UL (ref 4–11)

## 2025-01-18 PROCEDURE — 2500000003 HC RX 250 WO HCPCS: Performed by: INTERNAL MEDICINE

## 2025-01-18 PROCEDURE — 36415 COLL VENOUS BLD VENIPUNCTURE: CPT

## 2025-01-18 PROCEDURE — 80048 BASIC METABOLIC PNL TOTAL CA: CPT

## 2025-01-18 PROCEDURE — 80076 HEPATIC FUNCTION PANEL: CPT

## 2025-01-18 PROCEDURE — 84100 ASSAY OF PHOSPHORUS: CPT

## 2025-01-18 PROCEDURE — 6360000002 HC RX W HCPCS: Performed by: INTERNAL MEDICINE

## 2025-01-18 PROCEDURE — 2580000003 HC RX 258: Performed by: INTERNAL MEDICINE

## 2025-01-18 PROCEDURE — 83735 ASSAY OF MAGNESIUM: CPT

## 2025-01-18 PROCEDURE — 1200000000 HC SEMI PRIVATE

## 2025-01-18 PROCEDURE — 85025 COMPLETE CBC W/AUTO DIFF WBC: CPT

## 2025-01-18 PROCEDURE — 83880 ASSAY OF NATRIURETIC PEPTIDE: CPT

## 2025-01-18 RX ORDER — HALOPERIDOL 5 MG/ML
1 INJECTION INTRAMUSCULAR EVERY 6 HOURS PRN
Status: DISCONTINUED | OUTPATIENT
Start: 2025-01-18 | End: 2025-01-22 | Stop reason: HOSPADM

## 2025-01-18 RX ORDER — MORPHINE SULFATE 2 MG/ML
2 INJECTION, SOLUTION INTRAMUSCULAR; INTRAVENOUS
Status: DISCONTINUED | OUTPATIENT
Start: 2025-01-18 | End: 2025-01-22 | Stop reason: HOSPADM

## 2025-01-18 RX ORDER — LEVOTHYROXINE SODIUM 75 UG/1
75 TABLET ORAL
Status: DISCONTINUED | OUTPATIENT
Start: 2025-01-19 | End: 2025-01-22 | Stop reason: HOSPADM

## 2025-01-18 RX ORDER — FUROSEMIDE 10 MG/ML
40 INJECTION INTRAMUSCULAR; INTRAVENOUS ONCE
Status: COMPLETED | OUTPATIENT
Start: 2025-01-18 | End: 2025-01-18

## 2025-01-18 RX ORDER — GLYCOPYRROLATE 0.2 MG/ML
0.2 INJECTION INTRAMUSCULAR; INTRAVENOUS EVERY 4 HOURS PRN
Status: DISCONTINUED | OUTPATIENT
Start: 2025-01-18 | End: 2025-01-22 | Stop reason: HOSPADM

## 2025-01-18 RX ORDER — MORPHINE SULFATE 2 MG/ML
1 INJECTION, SOLUTION INTRAMUSCULAR; INTRAVENOUS
Status: DISCONTINUED | OUTPATIENT
Start: 2025-01-18 | End: 2025-01-22 | Stop reason: HOSPADM

## 2025-01-18 RX ORDER — FUROSEMIDE 10 MG/ML
20 INJECTION INTRAMUSCULAR; INTRAVENOUS ONCE
Status: DISCONTINUED | OUTPATIENT
Start: 2025-01-18 | End: 2025-01-18

## 2025-01-18 RX ADMIN — Medication: at 23:27

## 2025-01-18 RX ADMIN — PIPERACILLIN AND TAZOBACTAM 3375 MG: 3; .375 INJECTION, POWDER, LYOPHILIZED, FOR SOLUTION INTRAVENOUS at 23:30

## 2025-01-18 RX ADMIN — LEVOTHYROXINE SODIUM ANHYDROUS 50 MCG: 100 INJECTION, POWDER, LYOPHILIZED, FOR SOLUTION INTRAVENOUS at 09:16

## 2025-01-18 RX ADMIN — SODIUM CHLORIDE, PRESERVATIVE FREE 10 ML: 5 INJECTION INTRAVENOUS at 23:26

## 2025-01-18 RX ADMIN — SODIUM CHLORIDE, PRESERVATIVE FREE 10 ML: 5 INJECTION INTRAVENOUS at 08:23

## 2025-01-18 RX ADMIN — Medication: at 08:15

## 2025-01-18 RX ADMIN — SODIUM CHLORIDE, PRESERVATIVE FREE 5 ML: 5 INJECTION INTRAVENOUS at 09:16

## 2025-01-18 RX ADMIN — FUROSEMIDE 40 MG: 10 INJECTION, SOLUTION INTRAMUSCULAR; INTRAVENOUS at 16:11

## 2025-01-18 RX ADMIN — PIPERACILLIN AND TAZOBACTAM 3375 MG: 3; .375 INJECTION, POWDER, LYOPHILIZED, FOR SOLUTION INTRAVENOUS at 12:05

## 2025-01-19 LAB
ALBUMIN SERPL-MCNC: 3.2 G/DL (ref 3.4–5)
ALP SERPL-CCNC: 54 U/L (ref 40–129)
ALT SERPL-CCNC: 26 U/L (ref 10–40)
ANION GAP SERPL CALCULATED.3IONS-SCNC: 12 MMOL/L (ref 3–16)
AST SERPL-CCNC: 14 U/L (ref 15–37)
BASOPHILS # BLD: 0 K/UL (ref 0–0.2)
BASOPHILS NFR BLD: 0.4 %
BILIRUB DIRECT SERPL-MCNC: 0.2 MG/DL (ref 0–0.3)
BILIRUB INDIRECT SERPL-MCNC: 0.2 MG/DL (ref 0–1)
BILIRUB SERPL-MCNC: 0.4 MG/DL (ref 0–1)
BUN SERPL-MCNC: 59 MG/DL (ref 7–20)
CALCIUM SERPL-MCNC: 8.4 MG/DL (ref 8.3–10.6)
CHLORIDE SERPL-SCNC: 105 MMOL/L (ref 99–110)
CO2 SERPL-SCNC: 23 MMOL/L (ref 21–32)
CREAT SERPL-MCNC: 2.7 MG/DL (ref 0.8–1.3)
DEPRECATED RDW RBC AUTO: 15 % (ref 12.4–15.4)
EOSINOPHIL # BLD: 0.1 K/UL (ref 0–0.6)
EOSINOPHIL NFR BLD: 1.9 %
GFR SERPLBLD CREATININE-BSD FMLA CKD-EPI: 21 ML/MIN/{1.73_M2}
GLUCOSE SERPL-MCNC: 84 MG/DL (ref 70–99)
HCT VFR BLD AUTO: 29 % (ref 40.5–52.5)
HGB BLD-MCNC: 9.6 G/DL (ref 13.5–17.5)
LYMPHOCYTES # BLD: 0.7 K/UL (ref 1–5.1)
LYMPHOCYTES NFR BLD: 8.4 %
MAGNESIUM SERPL-MCNC: 2.58 MG/DL (ref 1.8–2.4)
MCH RBC QN AUTO: 31.3 PG (ref 26–34)
MCHC RBC AUTO-ENTMCNC: 33 G/DL (ref 31–36)
MCV RBC AUTO: 95 FL (ref 80–100)
MONOCYTES # BLD: 0.7 K/UL (ref 0–1.3)
MONOCYTES NFR BLD: 8.8 %
NEUTROPHILS # BLD: 6.4 K/UL (ref 1.7–7.7)
NEUTROPHILS NFR BLD: 80.5 %
PHOSPHATE SERPL-MCNC: 4.3 MG/DL (ref 2.5–4.9)
PLATELET # BLD AUTO: 175 K/UL (ref 135–450)
PMV BLD AUTO: 10.2 FL (ref 5–10.5)
POTASSIUM SERPL-SCNC: 4 MMOL/L (ref 3.5–5.1)
PROT SERPL-MCNC: 5.7 G/DL (ref 6.4–8.2)
RBC # BLD AUTO: 3.05 M/UL (ref 4.2–5.9)
SODIUM SERPL-SCNC: 140 MMOL/L (ref 136–145)
WBC # BLD AUTO: 8 K/UL (ref 4–11)

## 2025-01-19 PROCEDURE — 2500000003 HC RX 250 WO HCPCS: Performed by: INTERNAL MEDICINE

## 2025-01-19 PROCEDURE — 85025 COMPLETE CBC W/AUTO DIFF WBC: CPT

## 2025-01-19 PROCEDURE — 84100 ASSAY OF PHOSPHORUS: CPT

## 2025-01-19 PROCEDURE — 1200000000 HC SEMI PRIVATE

## 2025-01-19 PROCEDURE — 6370000000 HC RX 637 (ALT 250 FOR IP): Performed by: INTERNAL MEDICINE

## 2025-01-19 PROCEDURE — 6370000000 HC RX 637 (ALT 250 FOR IP): Performed by: NURSE PRACTITIONER

## 2025-01-19 PROCEDURE — 80048 BASIC METABOLIC PNL TOTAL CA: CPT

## 2025-01-19 PROCEDURE — 36415 COLL VENOUS BLD VENIPUNCTURE: CPT

## 2025-01-19 PROCEDURE — 80076 HEPATIC FUNCTION PANEL: CPT

## 2025-01-19 PROCEDURE — 2580000003 HC RX 258: Performed by: INTERNAL MEDICINE

## 2025-01-19 PROCEDURE — 83735 ASSAY OF MAGNESIUM: CPT

## 2025-01-19 RX ORDER — TAMSULOSIN HYDROCHLORIDE 0.4 MG/1
0.4 CAPSULE ORAL DAILY
Status: DISCONTINUED | OUTPATIENT
Start: 2025-01-19 | End: 2025-01-22 | Stop reason: HOSPADM

## 2025-01-19 RX ORDER — TORSEMIDE 20 MG/1
20 TABLET ORAL DAILY
Status: DISCONTINUED | OUTPATIENT
Start: 2025-01-19 | End: 2025-01-20

## 2025-01-19 RX ORDER — NIFEDIPINE 30 MG
30 TABLET, EXTENDED RELEASE ORAL NIGHTLY
Status: DISCONTINUED | OUTPATIENT
Start: 2025-01-19 | End: 2025-01-22 | Stop reason: HOSPADM

## 2025-01-19 RX ADMIN — SODIUM CHLORIDE, PRESERVATIVE FREE 10 ML: 5 INJECTION INTRAVENOUS at 21:44

## 2025-01-19 RX ADMIN — TORSEMIDE 20 MG: 20 TABLET ORAL at 08:54

## 2025-01-19 RX ADMIN — BISACODYL 10 MG: 10 SUPPOSITORY RECTAL at 08:54

## 2025-01-19 RX ADMIN — Medication: at 08:54

## 2025-01-19 RX ADMIN — Medication: at 21:44

## 2025-01-19 RX ADMIN — NIFEDIPINE 30 MG: 30 TABLET, EXTENDED RELEASE ORAL at 21:43

## 2025-01-19 RX ADMIN — SODIUM CHLORIDE, PRESERVATIVE FREE 5 ML: 5 INJECTION INTRAVENOUS at 08:55

## 2025-01-19 RX ADMIN — LEVOTHYROXINE SODIUM 75 MCG: 0.07 TABLET ORAL at 05:55

## 2025-01-19 RX ADMIN — TAMSULOSIN HYDROCHLORIDE 0.4 MG: 0.4 CAPSULE ORAL at 12:55

## 2025-01-20 ENCOUNTER — APPOINTMENT (OUTPATIENT)
Age: 89
DRG: 643 | End: 2025-01-20
Attending: INTERNAL MEDICINE
Payer: MEDICARE

## 2025-01-20 PROBLEM — I50.9 CONGESTIVE HEART FAILURE (CHF) (HCC): Status: ACTIVE | Noted: 2025-01-20

## 2025-01-20 LAB
ALBUMIN SERPL-MCNC: 3.2 G/DL (ref 3.4–5)
ALP SERPL-CCNC: 51 U/L (ref 40–129)
ALT SERPL-CCNC: 22 U/L (ref 10–40)
ANION GAP SERPL CALCULATED.3IONS-SCNC: 9 MMOL/L (ref 3–16)
AST SERPL-CCNC: 11 U/L (ref 15–37)
BACTERIA BLD CULT ORG #2: NORMAL
BACTERIA BLD CULT: NORMAL
BASOPHILS # BLD: 0 K/UL (ref 0–0.2)
BASOPHILS NFR BLD: 0.5 %
BILIRUB DIRECT SERPL-MCNC: <0.1 MG/DL (ref 0–0.3)
BILIRUB INDIRECT SERPL-MCNC: 0.2 MG/DL (ref 0–1)
BILIRUB SERPL-MCNC: 0.3 MG/DL (ref 0–1)
BUN SERPL-MCNC: 60 MG/DL (ref 7–20)
CALCIUM SERPL-MCNC: 8.5 MG/DL (ref 8.3–10.6)
CHLORIDE SERPL-SCNC: 105 MMOL/L (ref 99–110)
CO2 SERPL-SCNC: 25 MMOL/L (ref 21–32)
CREAT SERPL-MCNC: 2.5 MG/DL (ref 0.8–1.3)
DEPRECATED RDW RBC AUTO: 14.9 % (ref 12.4–15.4)
ECHO BSA: 1.95 M2
ECHO LV EDV A4C: 100 ML
ECHO LV EDV INDEX A4C: 52 ML/M2
ECHO LV EF PHYSICIAN: 55 %
ECHO LV EJECTION FRACTION A4C: 40 %
ECHO LV ESV A4C: 60 ML
ECHO LV ESV INDEX A4C: 31 ML/M2
EOSINOPHIL # BLD: 0.2 K/UL (ref 0–0.6)
EOSINOPHIL NFR BLD: 2.6 %
GFR SERPLBLD CREATININE-BSD FMLA CKD-EPI: 24 ML/MIN/{1.73_M2}
GLUCOSE SERPL-MCNC: 101 MG/DL (ref 70–99)
HCT VFR BLD AUTO: 28.1 % (ref 40.5–52.5)
HGB BLD-MCNC: 9.4 G/DL (ref 13.5–17.5)
LYMPHOCYTES # BLD: 0.9 K/UL (ref 1–5.1)
LYMPHOCYTES NFR BLD: 9.8 %
MAGNESIUM SERPL-MCNC: 2.36 MG/DL (ref 1.8–2.4)
MCH RBC QN AUTO: 31.7 PG (ref 26–34)
MCHC RBC AUTO-ENTMCNC: 33.5 G/DL (ref 31–36)
MCV RBC AUTO: 94.8 FL (ref 80–100)
MONOCYTES # BLD: 0.8 K/UL (ref 0–1.3)
MONOCYTES NFR BLD: 9.2 %
NEUTROPHILS # BLD: 6.8 K/UL (ref 1.7–7.7)
NEUTROPHILS NFR BLD: 77.9 %
NT-PROBNP SERPL-MCNC: ABNORMAL PG/ML (ref 0–449)
PHOSPHATE SERPL-MCNC: 4.4 MG/DL (ref 2.5–4.9)
PLATELET # BLD AUTO: 181 K/UL (ref 135–450)
PMV BLD AUTO: 10.2 FL (ref 5–10.5)
POTASSIUM SERPL-SCNC: 4.4 MMOL/L (ref 3.5–5.1)
PROT SERPL-MCNC: 5.7 G/DL (ref 6.4–8.2)
RBC # BLD AUTO: 2.97 M/UL (ref 4.2–5.9)
SODIUM SERPL-SCNC: 139 MMOL/L (ref 136–145)
WBC # BLD AUTO: 8.8 K/UL (ref 4–11)

## 2025-01-20 PROCEDURE — 36415 COLL VENOUS BLD VENIPUNCTURE: CPT

## 2025-01-20 PROCEDURE — 97116 GAIT TRAINING THERAPY: CPT

## 2025-01-20 PROCEDURE — 80076 HEPATIC FUNCTION PANEL: CPT

## 2025-01-20 PROCEDURE — 97530 THERAPEUTIC ACTIVITIES: CPT

## 2025-01-20 PROCEDURE — 84100 ASSAY OF PHOSPHORUS: CPT

## 2025-01-20 PROCEDURE — 92526 ORAL FUNCTION THERAPY: CPT

## 2025-01-20 PROCEDURE — 2500000003 HC RX 250 WO HCPCS: Performed by: INTERNAL MEDICINE

## 2025-01-20 PROCEDURE — 1200000000 HC SEMI PRIVATE

## 2025-01-20 PROCEDURE — 97166 OT EVAL MOD COMPLEX 45 MIN: CPT

## 2025-01-20 PROCEDURE — 97162 PT EVAL MOD COMPLEX 30 MIN: CPT

## 2025-01-20 PROCEDURE — 80048 BASIC METABOLIC PNL TOTAL CA: CPT

## 2025-01-20 PROCEDURE — 76376 3D RENDER W/INTRP POSTPROCES: CPT | Performed by: INTERNAL MEDICINE

## 2025-01-20 PROCEDURE — 6370000000 HC RX 637 (ALT 250 FOR IP): Performed by: NURSE PRACTITIONER

## 2025-01-20 PROCEDURE — 85025 COMPLETE CBC W/AUTO DIFF WBC: CPT

## 2025-01-20 PROCEDURE — 83735 ASSAY OF MAGNESIUM: CPT

## 2025-01-20 PROCEDURE — 93325 DOPPLER ECHO COLOR FLOW MAPG: CPT | Performed by: INTERNAL MEDICINE

## 2025-01-20 PROCEDURE — 83880 ASSAY OF NATRIURETIC PEPTIDE: CPT

## 2025-01-20 PROCEDURE — 93325 DOPPLER ECHO COLOR FLOW MAPG: CPT

## 2025-01-20 PROCEDURE — 97535 SELF CARE MNGMENT TRAINING: CPT

## 2025-01-20 PROCEDURE — 2580000003 HC RX 258: Performed by: INTERNAL MEDICINE

## 2025-01-20 PROCEDURE — 93308 TTE F-UP OR LMTD: CPT | Performed by: INTERNAL MEDICINE

## 2025-01-20 PROCEDURE — 6370000000 HC RX 637 (ALT 250 FOR IP): Performed by: INTERNAL MEDICINE

## 2025-01-20 RX ORDER — TORSEMIDE 20 MG/1
20 TABLET ORAL DAILY
Status: DISCONTINUED | OUTPATIENT
Start: 2025-01-21 | End: 2025-01-20

## 2025-01-20 RX ORDER — FUROSEMIDE 20 MG/1
10 TABLET ORAL DAILY
Status: DISCONTINUED | OUTPATIENT
Start: 2025-01-21 | End: 2025-01-22 | Stop reason: HOSPADM

## 2025-01-20 RX ADMIN — SODIUM CHLORIDE, PRESERVATIVE FREE 10 ML: 5 INJECTION INTRAVENOUS at 08:06

## 2025-01-20 RX ADMIN — SODIUM CHLORIDE, PRESERVATIVE FREE 5 ML: 5 INJECTION INTRAVENOUS at 08:06

## 2025-01-20 RX ADMIN — LEVOTHYROXINE SODIUM 75 MCG: 0.07 TABLET ORAL at 05:49

## 2025-01-20 RX ADMIN — SODIUM CHLORIDE, PRESERVATIVE FREE 10 ML: 5 INJECTION INTRAVENOUS at 20:46

## 2025-01-20 RX ADMIN — TAMSULOSIN HYDROCHLORIDE 0.4 MG: 0.4 CAPSULE ORAL at 08:05

## 2025-01-20 RX ADMIN — Medication: at 20:46

## 2025-01-20 RX ADMIN — NIFEDIPINE 30 MG: 30 TABLET, EXTENDED RELEASE ORAL at 20:46

## 2025-01-20 RX ADMIN — TORSEMIDE 20 MG: 20 TABLET ORAL at 08:05

## 2025-01-20 RX ADMIN — BISACODYL 10 MG: 10 SUPPOSITORY RECTAL at 11:08

## 2025-01-20 RX ADMIN — Medication: at 11:08

## 2025-01-21 PROCEDURE — 2500000003 HC RX 250 WO HCPCS: Performed by: INTERNAL MEDICINE

## 2025-01-21 PROCEDURE — 97110 THERAPEUTIC EXERCISES: CPT

## 2025-01-21 PROCEDURE — 97116 GAIT TRAINING THERAPY: CPT

## 2025-01-21 PROCEDURE — 1200000000 HC SEMI PRIVATE

## 2025-01-21 PROCEDURE — 6370000000 HC RX 637 (ALT 250 FOR IP): Performed by: INTERNAL MEDICINE

## 2025-01-21 PROCEDURE — 97530 THERAPEUTIC ACTIVITIES: CPT

## 2025-01-21 RX ORDER — LEVOTHYROXINE SODIUM 75 UG/1
75 TABLET ORAL
Qty: 30 TABLET | Refills: 3 | Status: CANCELLED
Start: 2025-01-22

## 2025-01-21 RX ORDER — FUROSEMIDE 20 MG/1
10 TABLET ORAL DAILY
Qty: 60 TABLET | Refills: 3 | Status: CANCELLED
Start: 2025-01-22

## 2025-01-21 RX ADMIN — Medication: at 19:59

## 2025-01-21 RX ADMIN — TAMSULOSIN HYDROCHLORIDE 0.4 MG: 0.4 CAPSULE ORAL at 08:48

## 2025-01-21 RX ADMIN — LEVOTHYROXINE SODIUM 75 MCG: 0.07 TABLET ORAL at 06:35

## 2025-01-21 RX ADMIN — SODIUM CHLORIDE, PRESERVATIVE FREE 10 ML: 5 INJECTION INTRAVENOUS at 08:49

## 2025-01-21 RX ADMIN — FUROSEMIDE 10 MG: 20 TABLET ORAL at 08:48

## 2025-01-21 RX ADMIN — Medication: at 08:48

## 2025-01-21 RX ADMIN — SODIUM CHLORIDE, PRESERVATIVE FREE 10 ML: 5 INJECTION INTRAVENOUS at 20:01

## 2025-01-21 RX ADMIN — NIFEDIPINE 30 MG: 30 TABLET, EXTENDED RELEASE ORAL at 20:00

## 2025-01-22 VITALS
TEMPERATURE: 97.6 F | WEIGHT: 177.47 LBS | OXYGEN SATURATION: 97 % | HEIGHT: 69 IN | HEART RATE: 82 BPM | RESPIRATION RATE: 18 BRPM | BODY MASS INDEX: 26.29 KG/M2 | SYSTOLIC BLOOD PRESSURE: 144 MMHG | DIASTOLIC BLOOD PRESSURE: 71 MMHG

## 2025-01-22 PROCEDURE — 2500000003 HC RX 250 WO HCPCS: Performed by: INTERNAL MEDICINE

## 2025-01-22 PROCEDURE — 6370000000 HC RX 637 (ALT 250 FOR IP): Performed by: INTERNAL MEDICINE

## 2025-01-22 RX ORDER — LEVOTHYROXINE SODIUM 75 UG/1
75 TABLET ORAL
Qty: 30 TABLET | Refills: 3
Start: 2025-01-23

## 2025-01-22 RX ORDER — FUROSEMIDE 20 MG/1
10 TABLET ORAL DAILY
Qty: 60 TABLET | Refills: 3
Start: 2025-01-23

## 2025-01-22 RX ADMIN — SODIUM CHLORIDE, PRESERVATIVE FREE 10 ML: 5 INJECTION INTRAVENOUS at 08:33

## 2025-01-22 RX ADMIN — TAMSULOSIN HYDROCHLORIDE 0.4 MG: 0.4 CAPSULE ORAL at 08:33

## 2025-01-22 RX ADMIN — LEVOTHYROXINE SODIUM 75 MCG: 0.07 TABLET ORAL at 06:42

## 2025-01-22 RX ADMIN — Medication: at 08:36

## 2025-01-22 RX ADMIN — FUROSEMIDE 10 MG: 20 TABLET ORAL at 08:33

## 2025-01-22 NOTE — PROGRESS NOTES
Speech Language Pathology  Facility/Department:[facility] PCU  Dysphagia Treatment DC    Name: John Berg  : 1932  MRN: [id number]                                                     Patient Diagnosis(es):   Patient Active Problem List    Diagnosis Date Noted    Congestive heart failure (CHF) ([facility]) 2025    Hypothyroid coma ([facility]) 2025    Cellulitis of head except face 2017    Neck pain 2015    Left knee pain 2012    Anemia 2012    Knee pain, right 2011    Hypertension     Hyperlipidemia      Past Medical History:   Diagnosis Date    Anemia     CAD (coronary artery disease)     CKD (chronic kidney disease) stage 3, GFR 30-59 ml/min ([facility])     Enlarged prostate     History of CVA (cerebrovascular accident)     Hyperlipidemia     Hypertension     NBCCS (nevoid basal cell carcinoma syndrome)     right lower cheek    Osteoarthrosis      Past Surgical History:   Procedure Laterality Date    CATARACT REMOVAL  2010    bilateral/Dr. Cosmo Franco    COLONOSCOPY      CYST REMOVAL      knee    JOINT REPLACEMENT      right knee    KNEE ARTHROSCOPY      right    PRE-MALIGNANT / BENIGN SKIN LESION EXCISION  2010    benign pilomatrixoma- chest wall     History of Present Illness  Per MD notes:  \" John Berg is a 89 y.o. male who presents to the Emergency Department for altered mental status.  Patient is from a nursing facility and reportedly has been altered throughout the day today.  This evening when they were trying to get the patient a bed he was unable to stand up which she is typically able to do..  On EMS arrival, patient was noted to have heart rates in the 20s and was given 1 mg of intravenous epinephrine with improvement of heart rate.  Patient is able to answer his name and does move all 4 extremities to command but is unable to provide a history.  Per documentation from the nursing care facility, patient is DNR CC.  \"    Imaging  XR CHEST 
    Speech Language Pathology  Facility/Department:[facility] PCU  Dysphagia Evaluation    Name: John Berg  : 1932  MRN: [id number]                                                     Patient Diagnosis(es):   Patient Active Problem List    Diagnosis Date Noted    Hypothyroid coma (HCC) 2025    Cellulitis of head except face 2017    Neck pain 2015    Left knee pain 2012    Anemia 2012    Knee pain, right 2011    Hypertension     Hyperlipidemia      Past Medical History:   Diagnosis Date    Anemia     CAD (coronary artery disease)     CKD (chronic kidney disease) stage 3, GFR 30-59 ml/min (HCC)     Enlarged prostate     History of CVA (cerebrovascular accident)     Hyperlipidemia     Hypertension     NBCCS (nevoid basal cell carcinoma syndrome)     right lower cheek    Osteoarthrosis      Past Surgical History:   Procedure Laterality Date    CATARACT REMOVAL  2010    bilateral/Dr. Cosmo Franco    COLONOSCOPY      CYST REMOVAL      knee    JOINT REPLACEMENT      right knee    KNEE ARTHROSCOPY      right    PRE-MALIGNANT / BENIGN SKIN LESION EXCISION  2010    benign pilomatrixoma- chest wall     Reason for Referral:  John Berg  was referred for a Speech Therapy evaluation to assess swallow function and/or communication.    History of Present Illness  Per MD notes:  \" John Berg is a 89 y.o. male who presents to the Emergency Department for altered mental status.  Patient is from a nursing facility and reportedly has been altered throughout the day today.  This evening when they were trying to get the patient a bed he was unable to stand up which she is typically able to do..  On EMS arrival, patient was noted to have heart rates in the 20s and was given 1 mg of intravenous epinephrine with improvement of heart rate.  Patient is able to answer his name and does move all 4 extremities to command but is unable to provide a history.  Per documentation 
    Speech Language Pathology  Facility/Department:[facility] PCU  Dysphagia treatment    Name: John Berg  : 1932  MRN: [id number]                                                     Patient Diagnosis(es):   Patient Active Problem List    Diagnosis Date Noted    Hypothyroid coma (HCC) 2025    Cellulitis of head except face 2017    Neck pain 2015    Left knee pain 2012    Anemia 2012    Knee pain, right 2011    Hypertension     Hyperlipidemia      Past Medical History:   Diagnosis Date    Anemia     CAD (coronary artery disease)     CKD (chronic kidney disease) stage 3, GFR 30-59 ml/min (HCC)     Enlarged prostate     History of CVA (cerebrovascular accident)     Hyperlipidemia     Hypertension     NBCCS (nevoid basal cell carcinoma syndrome)     right lower cheek    Osteoarthrosis      Past Surgical History:   Procedure Laterality Date    CATARACT REMOVAL  2010    bilateral/Dr. Cosmo Franco    COLONOSCOPY      CYST REMOVAL      knee    JOINT REPLACEMENT      right knee    KNEE ARTHROSCOPY      right    PRE-MALIGNANT / BENIGN SKIN LESION EXCISION  2010    benign pilomatrixoma- chest wall     History of Present Illness  Per MD notes:  \" John Berg is a 89 y.o. male who presents to the Emergency Department for altered mental status.  Patient is from a nursing facility and reportedly has been altered throughout the day today.  This evening when they were trying to get the patient a bed he was unable to stand up which she is typically able to do..  On EMS arrival, patient was noted to have heart rates in the 20s and was given 1 mg of intravenous epinephrine with improvement of heart rate.  Patient is able to answer his name and does move all 4 extremities to command but is unable to provide a history.  Per documentation from the nursing care facility, patient is DNR CC.  \"    Imaging  XR CHEST PORTABLE   Final Result      No acute pulmonary disease.       
    [place]2.[place]  [facility] [place]      Name:  John Berg /Age/Sex: 1932  (89 y.o. male)   MRN & CSN:  [id number] & [id number] Encounter Date/Time: 2025 11:43 AM EST    Location:  [address] [place] [address] PCP: Taurus Washington MD       Hospital Day: 3    Assessment and Plan:   John Berg is a 89 y.o. male with pmh of  who presents with Hypothyroid coma (HCC)      Plan:    SIRS  Acute metabolic encephalopathy  Suspected due to hypothyroidism, dehydration, probably underlying failure to thrive  Presented with hypothermia, bradycardia  Also concern for sepsis but no clear source of infection  Been on IV Zosyn since admission  Continue hydration  Bradycardia and hypothermia have improved      Hypothyroidism  Even though TSH is not very high, concern for myxedema coma given the presentation  Continue Synthroid injection.      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  Palliative care on board.  Palliative care had a chance to speak with patient's daughter who is POA. Ongoing discussion about goals of care and possible transition to hospice.      Diet ADULT DIET; Dysphagia - Soft and Bite Sized  ADULT ORAL NUTRITION SUPPLEMENT; Breakfast, Dinner; Standard High Calorie/High Protein Oral Supplement  ADULT ORAL NUTRITION SUPPLEMENT; Lunch; Frozen Oral Supplement   DVT Prophylaxis [] Lovenox, []  Heparin, [] SCDs, [] Ambulation,  [] Eliquis, [] Xarelto  [] Coumadin   Code Status DNR-CC   Disposition From:   Expected Disposition:   Estimated Date of Discharge:   Patient requires continued admission due to    Surrogate Decision Maker/ POA      Personally reviewed Lab Studies 
    V2.0  [facility] Hospital[place] Note      Name:  John Berg /Age/Sex: 1932  (89 y.o. male)   MRN & CSN:  [id number] & [id number] Encounter Date/Time: 2025 11:43 AM EST    Location:  [facility] PCP: Taurus Washington MD       Hospital Day: 7    Assessment and Plan:   John Berg is a 89 y.o. male with pmh of  who presents with Hypothyroid coma (HCC)      Plan:    SIRS, without infection  Acute metabolic encephalopathy, multifactorial, improving  Suspected due to hypothyroidism, dehydration, probably underlying failure to thrive. Presented with hypothermia, bradycardia.Initial concer for sepsis - but no concerning evidence -antibiotics have been stopped. Good oral intake. Bradycardia and hypothermia have resolved.    -Continue to monitor    Hypothyroidism  Even though TSH is not very high, concern was for myxedema coma given the presentation but unlikely given the rapid improvement.  Furthermore bilateral lower extremity edema for pitting and appears to be related to congestive heart failure.  STOPPED Synthroid injection. STARTED oral levothyroxine 75 mcg.    -Repeat TSH in 6 weeks as outpatient.    TE on CKD, likely cardiorenal  Bilateral LE pitting edema  Creatinine 2.6 Mg/DL, elevated proBNP. Prior creatinine a month ago, 1.76 Mg/DL.  Gave  IV lasix 40 mg on 2025 - good urine output - Cr remained stable.  Then started on Torsemide 20 mg daily -plan for 3 doses-patient noted to have significant urine output and overall edema has significantly improved.  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 
    V2.0  [facility] Hospitalist Progress Note      Name:  John Berg /Age/Sex: 1932  (89 y.o. male)   MRN & CSN:  [id number] & [id number] Encounter Date/Time: 2025 11:43 AM EST    Location:  [address] [place] [address] PCP: Taurus Washington MD       Hospital Day: 5    Assessment and Plan:   John Berg is a 89 y.o. male with pmh of  who presents with Hypothyroid coma (HCC)      Plan:    SIRS, without infection  Acute metabolic encephalopathy, multifactorial  Suspected due to hypothyroidism, dehydration, probably underlying failure to thrive  Presented with hypothermia, bradycardia  Initial concer for sepsis - but no concerning evidence - stopped antibiotics  Good oral intake  Bradycardia and hypothermia have improved    Hypothyroidism  Even though TSH is not very high, concern was for myxedema coma given the presentation but unlikely given the rapid improvement.   STOPPED Synthroid injection.  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  Palliative care on board.  Palliative care had a chance to speak with patient's daughter who is POA. Discussed with daughter at bedside on 2025. Family leaning toward SNF for now and hospice down to the road if needed.      Diet ADULT DIET; Dysphagia - Soft and Bite Sized  ADULT ORAL NUTRITION SUPPLEMENT; Breakfast, Dinner; Standard High Calorie/High Protein Oral Supplement  ADULT ORAL 
  Comprehensive Nutrition Assessment    RECOMMENDATIONS:  PO Diet: Dysphagia soft and bite-sized  Nutrition Supplement: Add Ensure Plus HP BID and Magic cup QD  Nutrition Education: Education/Counseling not indicated     NUTRITION ASSESSMENT:   Nutritional summary & status: Positive screen for wounds. Patient admitted from SNF with AMS and hyponatremia, noted to hava stg 2 to buttocks and stg 1 to bilateral heels. Patients wt has been stable over the past year per EMR. Patient not appropriate for interview this morning, alert to self. Patient is tolerating a soft and bite-sized diet per SLP recs with fair po intake of two meals so far this admit. Patient s/p CVA ~11/2024 requiring a two week admit to [facility] rehab, noted patient is favorable to chocolate Ensure and Magic cups. Bowels moving and labs reviewed. RD will order ONS to aid in wound healing and continue to montitor nutritional status.   Admission // PMH: AMS // HTN, CKD, CAD, HLD    MALNUTRITION ASSESSMENT  Context of Malnutrition: Acute Illness   Malnutrition Status: At risk for malnutrition  Findings of the 6 clinical characteristics of malnutrition (Minimum of 2 out of 6 clinical characteristics is required to make the diagnosis of moderate or severe Protein Calorie Malnutrition based on AND/ASPEN Guidelines):  Energy Intake:  Mild decrease in energy intake  Weight Loss:  No weight loss     Body Fat Loss:  Unable to assess     Muscle Mass Loss:  Unable to assess    Fluid Accumulation:  No fluid accumulation     Strength:  Not Performed    NUTRITION DIAGNOSIS   Increased nutrient needs related to catabolic illness as evidenced by wounds    Nutrition Monitoring and Evaluation:   Food/Nutrient Intake Outcomes:  Food and Nutrient Intake, Supplement Intake  Physical Signs/Symptoms Outcomes:  Biochemical Data, GI Status, Nutrition Focused Physical Findings, Skin     OBJECTIVE DATA: Significant to nutrition assessment  Nutrition Related Findings: +BM 
0750: came into patient room to get vitals. When taking pt BP he began to shake and have seizure like activity. Pt HOB lowered and turned onto side. Seizure lasted 2-3 minutes and after pt was able to respond to voice, but had twitching in extremities. Night shift provider messaged, but message was unread. Message attempted to be sent to day shift provider but was forwarded to someone different.     0930: Day shift provider attempted to be reached multiple times without success, charge RN notified. Pt able to respond to voice with delayed and slurred responses. Pt still has twitching in extremities while at rest  
4 Eyes Skin Assessment     NAME:  John Berg  YOB: 1932  MEDICAL RECORD NUMBER:  [id number]    The patient is being assessed for  Admission    I agree that at least one RN has performed a thorough Head to Toe Skin Assessment on the patient. ALL assessment sites listed below have been assessed.      Areas assessed by both nurses:    Head, Face, Ears, Shoulders, Back, Chest, Arms, Elbows, Hands, Sacrum. Buttock, Coccyx, Ischium, Legs. Feet and Heels, Under Medical Devices         Does the Patient have a Wound? Yes wound(s) were present on assessment. LDA wound assessment was Initiated and completed by RN  Stage I Bilateral Heels  Stage II Bilateral Buttocks  Scattered redness and abrasions  DTI/Bruising above coccyx and on R hip  Scars Bilateral Knees  Excoriation to conor area open in groin folds  Skin tear/Laceration R elbow       Anil Prevention initiated by RN: Yes  Wound Care Orders initiated by RN: Yes    Pressure Injury (Stage 3,4, Unstageable, DTI, NWPT, and Complex wounds) if present, place Wound referral order by RN under : Yes    New Ostomies, if present place, Ostomy referral order under : No     Nurse 1 eSignature: Electronically signed by Koki Krueger RN on 1/16/25 at 2:14 AM EST    **SHARE this note so that the co-signing nurse can place an eSignature**    Nurse 2 eSignature: Electronically signed by Janna Lorenzo RN on 1/16/25 at 2:36 AM EST   
Consulted for scattered stage 2 pressure injuries on bilateral buttocks and stage 1 pressure injuries on bilateral heels. Wound care orders placed. Wound Care to sign off. Re-consult for changes or deterioration.  
Conversation shared with patient's family at bedside. They expressed desire for brief check-ins and prayer with patient. Patient agreeable to this.    01/16/25 1340   Encounter Summary   Encounter Overview/Reason Initial Encounter   Service Provided For Patient and family together   Referral/Consult From [facility]   Support System Children;Spouse   Last Encounter  01/16/25  (GRD)   Complexity of Encounter Low   Begin Time 1335   End Time  1340   Total Time Calculated 5 min   Spiritual/Emotional needs   Type Spiritual Support   Assessment/Intervention/Outcome   Assessment Calm;Coping;Peaceful   Intervention Active listening;Explored/Affirmed feelings, thoughts, concerns;Prayer (assurance of)/[place]   Outcome Comfort;Receptive;Engaged in conversation     Spiritual Health History and Assessment/Progress Note  [facility]    Initial Encounter,  ,  ,      Name: John Berg MRN: [id number]    Age: 89 y.o.     Sex: male   Language: English   Scientologist: [employer]   Hypothyroid coma (HCC)     Date: 1/16/2025            Total Time Calculated: 5 min              Spiritual Assessment began in [facility] PCU        Referral/Consult From: Rounding   Encounter Overview/Reason: Initial Encounter  Service Provided For: Patient and family together    Lien, Belief, Meaning:   Patient is connected with a lien tradition or spiritual practice  Family/Friends are connected with a lien tradition or spiritual practice      Importance and Influence:  Patient has spiritual/personal beliefs that influence decisions regarding their health  Family/Friends have spiritual/personal beliefs that influence decisions regarding the patient's health    Community:  Patient is connected with a spiritual community and feels well-supported. Support system includes: Spouse/Partner and Children  Family/Friends Other: Did not assess    Assessment and Plan of Care:     Patient Interventions include: Facilitated expression of thoughts and 
Follow up visit offered to patient per family request, no spiritual care needs or prayer requests identified at this time.    01/17/25 1359   Encounter Summary   Encounter Overview/Reason Follow-up   Service Provided For Patient   Referral/Consult From Family   Support System Children;Spouse   Last Encounter  01/17/25  (GRD F/U for occasional prayer, no needs today)   Complexity of Encounter Low   Begin Time 1355   End Time  1400   Total Time Calculated 5 min   Assessment/Intervention/Outcome   Assessment Calm;Coping   Intervention Sustaining Presence/Ministry of presence;Active listening   Outcome Refused/Declined     Spiritual Health History and Assessment/Progress Note  [facility]    (P) Follow-up,  ,  ,      Name: John Berg MRN: [id number]    Age: 89 y.o.     Sex: male   Language: English   Protestant: [employer]   Hypothyroid coma (HCC)     Date: 1/17/2025            Total Time Calculated: (P) 5 min              Spiritual Assessment continued in [facility] PCU        Referral/Consult From: (P) Family   Encounter Overview/Reason: (P) Follow-up  Service Provided For: (P) Patient    Lien, Belief, Meaning:   Patient is connected with a lien tradition or spiritual practice  Family/Friends No family/friends present      Importance and Influence:  Patient has spiritual/personal beliefs that influence decisions regarding their health  Family/Friends No family/friends present    Community:  Patient feels well-supported. Support system includes: Spouse/Partner, Children, and Lien Community  Family/Friends No family/friends present    Assessment and Plan of Care:     Patient Interventions include: Facilitated expression of thoughts and feelings  Family/Friends Interventions include: No family/friends present    Patient Plan of Care: No spiritual needs identified for follow-up and Spiritual Care available upon further referral  Family/Friends Plan of Care: No family/friends present    Electronically signed 
MD came to bedside and situation from this AM was explained. Plan of care updated. Pt temp 98.3, MD verified okay to remove warming blanket for now and monitor temp.  
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 Diagnoses of Hypothermia, initial encounter, Hyponatremia, Elevated TSH, and Congestive heart failure, unspecified HF chronicity, unspecified heart failure type (HCC) were also pertinent to this visit.  Past Medical History:  has a past medical history of Anemia, CAD (coronary artery disease), CKD (chronic kidney disease) stage 3, GFR 30-59 ml/min (HCC), Enlarged prostate, History of CVA (cerebrovascular accident), Hyperlipidemia, Hypertension, NBCCS (nevoid basal cell carcinoma syndrome), and Osteoarthrosis.  Past Surgical History:  has a past surgical history that includes cyst removal; pre-malignant / benign skin lesion excision (2010); Cataract removal (2010); Knee arthroscopy; joint replacement; and Colonoscopy.    Treatment Diagnosis: imp mob, tf, ADL      Assessment  Performance deficits / Impairments: Decreased functional mobility ;Decreased ADL status;Decreased endurance;Decreased cognition  Assessment: From home with spouse. Pt completing mobility with Min to ModA this date short mobility bed to chair. Assist with ADLs. Pt would benefit from cont therapies while in acute care and cont in care at [place].  Treatment Diagnosis: imp mob, tf, ADL  Decision Making: Medium Complexity  REQUIRES OT FOLLOW-UP: Yes  Activity Tolerance  Activity Tolerance: Patient limited by fatigue     Plan  Occupational Therapy Plan  Times Per Week: 2-5    Restrictions  Restrictions/Precautions  Activity Level: Up as Tolerated    Subjective  General  Chart Reviewed: Yes  Additional Pertinent Hx: 89 y.o. male with pmh of  who presents with Hypothyroid coma. 
Palliative Care Chart Review  and Check in Note:     NAME:  John Berg  Admit Date: 1/15/2025  Hospital Day:  Hospital Day: 3   Current Code status: DNR-CC    Palliative care is continuing to following Mr. Berg for symptom management,  and goals of care discussion as needed. Patient's chart reviewed today 1/17/25.      Followed up with the daughter, Citlali Reinoso, about our discussion yesterday and how she felt about hospice. She spoke with the other family members and they were in agreement with hospice care for patient. She states that he was prior at [facility] living [facility] with his wife but states that it might not be a good place for him now. We included Cat CM on the phone to discuss long term care facility with hospice. Discussed hospice inpatient unit vs long term care with hospice. Family requested a referral to [facility] to explore LTC bed there.    Code status: DNR-CC     Discharge plan: possibly LTC with hospice, CM looking into [facility] per family request    Evangelina Jiang NP  Palliative Care  [phone]      
Palliative Care Chart Review  and Check in Note:     NAME:  John Berg  Admit Date: 1/15/2025  Hospital Day:  Hospital Day: 6   Current Code status: DNR-CC    Palliative care is continuing to following Mr. Berg for symptom management,  and goals of care discussion as needed. Patient's chart reviewed today 1/20/25.      The pt is up in the chair, says he is comfortable. He is oriented to person and time, disoriented to place and situation. Spoke with daughter Citlali on the phone. She is hoping for direction with placing her father. Discussed options of LTC vs SNF vs hospice inpatient unit. She is not interested in SNF (because she doesn't think the pt can tolerate PT/OT) or hospice inpatient unit at this point. She provided names of 3 long term care facilities, which I provided to Eri BRAND. Will meet with Citlali at the bedside at 1 pm per her request.    Addendum: Met with Citlali and her  in conference room along with Eri BRAND. The family is leaning toward LTC with hospice. Answered their questions about hospice. Eri will f/u on LTC options.    The following are the currently established goals/code status, and Symptom management.     Goals of care: Family wants to find a place for pt to be cared for. They are not interested in rehab at this time.    Code status: DNR-CC    Discharge plan: d/c to LTC when medically ready, likely with hospice    Evangelina Jiang NP  Palliative Care  [phone]    
Patient arrived on unit to room 4451 temperature is 87.8 and HR sustaining in 30s. All vitals documented in flowsheets. Patient alert and oriented to situation and self only. 4 eyes to follow. Electronically signed by Koki Krueger RN on 1/16/2025 at 2:08 AM   
Patient discharged to [facility] via EMS transportation. Patient report given to MANUEL Farfan of [facility]. AVS sent with patient. IV and telemetry monitoring removed prior to discharge. Per case management- daughter Citlali aware of time of discharge. Patient discharged with glasses, clothes and all other belongings.   
Physical Therapy  Facility/Department: [address]  Physical Therapy Daily Treatment Note    Name: John Berg  : 1932  MRN: [id number]  Date of Service: 2025    Discharge Recommendations:  Subacute/Skilled Nursing Facility   PT Equipment Recommendations  Other: defer      Patient Diagnosis(es): The primary encounter diagnosis was Myxedema. Diagnoses of Hypothermia, initial encounter, Hyponatremia, Elevated TSH, and Congestive heart failure, unspecified HF chronicity, unspecified heart failure type (HCC) were also pertinent to this visit.  Past Medical History:  has a past medical history of Anemia, CAD (coronary artery disease), CKD (chronic kidney disease) stage 3, GFR 30-59 ml/min (HCC), Enlarged prostate, History of CVA (cerebrovascular accident), Hyperlipidemia, Hypertension, NBCCS (nevoid basal cell carcinoma syndrome), and Osteoarthrosis.  Past Surgical History:  has a past surgical history that includes cyst removal; pre-malignant / benign skin lesion excision (2010); Cataract removal (2010); Knee arthroscopy; joint replacement; and Colonoscopy.    Assessment  Assessment: Pt with improved tolerance to activity this date.  Pt able to ambulate a little further using wheeled walker and min assist.  Pt still requires heavy assist for OOB and with transfers.  Pt states \"It feels good to move\", and is motivated to exercise.  Pt states he used to be a runner and would run 10 miles every .  Rec continued inpt PT at d/c to maximize pt's mobility.  Will continue.  Treatment Diagnosis: dec functional mobility  Therapy Prognosis: Good  Decision Making: Medium Complexity  Requires PT Follow-Up: Yes    Plan  Physical Therapy Plan  General Plan:  (2-5)  Current Treatment Recommendations: Strengthening, Balance training, Functional mobility training, Gait training, Endurance training, Transfer training, Safety education & training, Therapeutic activities  Safety Devices  Type of Devices: 
Physical Therapy  Facility/Department: [facility]  Physical Therapy Initial Assessment    Name: John Berg  : 1932  MRN: [id number]  Date of Service: 2025    Discharge Recommendations:  Subacute/Skilled Nursing Facility   PT Equipment Recommendations  Other: defer      Patient Diagnosis(es): The primary encounter diagnosis was Myxedema. Diagnoses of Hypothermia, initial encounter, Hyponatremia, and Elevated TSH were also pertinent to this visit.  Past Medical History:  has a past medical history of Anemia, CAD (coronary artery disease), CKD (chronic kidney disease) stage 3, GFR 30-59 ml/min (HCC), Enlarged prostate, History of CVA (cerebrovascular accident), Hyperlipidemia, Hypertension, NBCCS (nevoid basal cell carcinoma syndrome), and Osteoarthrosis.  Past Surgical History:  has a past surgical history that includes cyst removal; pre-malignant / benign skin lesion excision (2010); Cataract removal (2010); Knee arthroscopy; joint replacement; and Colonoscopy.    Assessment  Body Structures, Functions, Activity Limitations Requiring Skilled Therapeutic Intervention: Decreased functional mobility ;Decreased endurance;Decreased cognition;Decreased balance;Decreased strength;Decreased safe awareness  Assessment: Pt is a 89 yo male with unknown PLOF 2/2 confusion and cognitive deficits, pt reports hes typically IND without AD, admitted with AMS and encephalopathy. Per chart pt from [facility] with spouse, family considering SNF vs LTC [place]. On eval, pt following one step commands requiring mod A for sit<>stand to RW and min A for bed<>chair with RW. pt unable to tolerate further ambulation this date 2/2 dizziness in stance requiring return to sitting. BP did show drop from sitting to stand and further mobility deferred. Pt is a high fall risk and unsafe to perform mobility alone. Recommend further IP PT at [place] to maximize independence. PT to f/u  Treatment Diagnosis: dec functional 
Upon assessment patient left pupil appeared to be 1-2 mm and sluggish compared to right pupil that was 2-3mm and sluggish. Patient also looked to have burst a vessel in his right eye. Patient seemed to be having worse and more frequent tremors or jerking movements compared to previous night with him. Sensation, strength, and movement of extremities seemed to match history/ baseline. Patient was unable to touch his left index finger to the tip of his nose and placed it on his chin before having tremors and becoming tense. Patient was able to follow that same command with the right hand with no issues. Notified Olya Tang via perfect serve of the findings and got this as a response, \"According to his notes, his weakness is chronic and on his right side? He is DNR-CC but family allowing some care, the Hospitalist note mentions he is Palliative Care or hospice appropriate. I do not want to put him through anything else tonight.\" I responded stating I would continue to monitor patient for any additional changes. As of 0335 patient appears to be resting comfortably and almost appropriate to come off of bare hugger. Electronically signed by Koki Krueger RN on 1/17/2025 at 3:38 AM   
Prophylaxis [] Lovenox, []  Heparin, [] SCDs, [] Ambulation,  [] Eliquis, [] Xarelto  [] Coumadin   Code Status DNR-CC   Disposition From:   Expected Disposition:   Estimated Date of Discharge:   Patient requires continued admission due to    Surrogate Decision Maker/ POA      Personally reviewed Lab Studies and Imaging       Subjective:     Chief Complaint: Altered Mental Status (Patient to the Er for altered mental status and shortness of breath. Patient unknown when last known well is per facility this started during the day.)       John Berg is a 89 y.o. male who presents with encephalopathy from nursing home.    Seen and examined in the morning.    Disoriented but comfortable.  No complaints of pain or discomfort.           Review of Systems:    Review of Systems    Negative if not mentioned above    Objective:     Intake/Output Summary (Last 24 hours) at 1/18/2025 1238  Last data filed at 1/18/2025 1145  Gross per 24 hour   Intake 560 ml   Output 900 ml   Net -340 ml        Vitals:   Vitals:    01/18/25 1145   BP: (!) 149/83   Pulse: 79   Resp: 18   Temp: 97.8 °F (36.6 °C)   SpO2:        Physical Exam:     General: Dry, bilateral upper extremity tremors  Eyes: EOMI  ENT: neck supple  Cardiovascular: Regular rate.  Respiratory: Clear to auscultation  Gastrointestinal: Soft, non tender  Genitourinary: no suprapubic tenderness  Musculoskeletal: No edema  Skin: warm, dry  Neuro: Lethargic, slow to respond, follows commands, bilateral upper extremity twitching      Medications:   Medications:    sodium chloride flush  5-40 mL IntraVENous 2 times per day    levothyroxine  50 mcg IntraVENous Daily    And    sodium chloride (PF)  5 mL IntraVENous Daily    piperacillin-tazobactam  3,375 mg IntraVENous Q12H    bisacodyl  10 mg Rectal Daily    balsum peru-castor oil   Topical BID      Infusions:    sodium chloride 25 mL (01/16/25 8467)     PRN Meds: morphine, 1 mg, Q2H PRN   Or  morphine, 2 mg, Q2H 
edema  Skin: warm, dry  Neuro: Lethargic, slow to respond, follows commands, bilateral upper extremity twitching      Medications:   Medications:    [START ON 1/21/2025] furosemide  10 mg Oral Daily    NIFEdipine  30 mg Oral Nightly    tamsulosin  0.4 mg Oral Daily    levothyroxine  75 mcg Oral QAM AC    sodium chloride flush  5-40 mL IntraVENous 2 times per day    sodium chloride (PF)  5 mL IntraVENous Daily    bisacodyl  10 mg Rectal Daily    balsum peru-castor oil   Topical BID      Infusions:    sodium chloride 25 mL (01/16/25 8954)     PRN Meds: morphine, 1 mg, Q2H PRN   Or  morphine, 2 mg, Q2H PRN  glycopyrrolate, 0.2 mg, Q4H PRN  haloperidol lactate, 1 mg, Q6H PRN  sodium chloride flush, 5-40 mL, PRN  sodium chloride, , PRN  ondansetron, 4 mg, Q8H PRN   Or  ondansetron, 4 mg, Q6H PRN  polyethylene glycol, 17 g, Daily PRN  acetaminophen, 650 mg, Q6H PRN   Or  acetaminophen, 650 mg, Q6H PRN        Labs      Recent Results (from the past 24 hour(s))   Basic Metabolic Panel    Collection Time: 01/20/25  5:02 AM   Result Value Ref Range    Sodium 139 136 - 145 mmol/L    Potassium 4.4 3.5 - 5.1 mmol/L    Chloride 105 99 - 110 mmol/L    CO2 25 21 - 32 mmol/L    Anion Gap 9 3 - 16    Glucose 101 (H) 70 - 99 mg/dL    BUN 60 (H) 7 - 20 mg/dL    Creatinine 2.5 (H) 0.8 - 1.3 mg/dL    Est, Glom Filt Rate 24 (A) >60    Calcium 8.5 8.3 - 10.6 mg/dL   Magnesium    Collection Time: 01/20/25  5:02 AM   Result Value Ref Range    Magnesium 2.36 1.80 - 2.40 mg/dL   Phosphorus    Collection Time: 01/20/25  5:02 AM   Result Value Ref Range    Phosphorus 4.4 2.5 - 4.9 mg/dL   Hepatic Function Panel    Collection Time: 01/20/25  5:02 AM   Result Value Ref Range    Total Protein 5.7 (L) 6.4 - 8.2 g/dL    Albumin 3.2 (L) 3.4 - 5.0 g/dL    Alkaline Phosphatase 51 40 - 129 U/L    ALT 22 10 - 40 U/L    AST 11 (L) 15 - 37 U/L    Total Bilirubin 0.3 0.0 - 1.0 mg/dL    Bilirubin, Direct <0.1 0.0 - 0.3 mg/dL    Bilirubin, Indirect 0.2 0.0 - 
   pH, Urine 6.0 5.0 - 8.0    Protein, UA 30 (A) Negative mg/dL    Urobilinogen, Urine 0.2 <2.0 E.U./dL    Nitrite, Urine Negative Negative    Leukocyte Esterase, Urine Negative Negative    Microscopic Examination YES     Urine Type NotGiven     WBC, UA None seen 0 - 5 /HPF    RBC, UA 0-2 0 - 4 /HPF   Lactic Acid    Collection Time: 01/15/25 11:57 PM   Result Value Ref Range    Lactic Acid 3.8 (H) 0.4 - 2.0 mmol/L   CBC auto differential 3 days    Collection Time: 01/16/25  3:17 AM   Result Value Ref Range    WBC 7.3 4.0 - 11.0 K/uL    RBC 2.70 (L) 4.20 - 5.90 M/uL    Hemoglobin 8.6 (L) 13.5 - 17.5 g/dL    Hematocrit 25.9 (L) 40.5 - 52.5 %    MCV 95.9 80.0 - 100.0 fL    MCH 31.7 26.0 - 34.0 pg    MCHC 33.0 31.0 - 36.0 g/dL    RDW 14.6 12.4 - 15.4 %    Platelets 164 135 - 450 K/uL    MPV 9.5 5.0 - 10.5 fL    Neutrophils % 90.0 %    Lymphocytes % 5.1 %    Monocytes % 4.7 %    Eosinophils % 0.1 %    Basophils % 0.1 %    Neutrophils Absolute 6.6 1.7 - 7.7 K/uL    Lymphocytes Absolute 0.4 (L) 1.0 - 5.1 K/uL    Monocytes Absolute 0.3 0.0 - 1.3 K/uL    Eosinophils Absolute 0.0 0.0 - 0.6 K/uL    Basophils Absolute 0.0 0.0 - 0.2 K/uL        Imaging/Diagnostics Last 24 Hours   XR CHEST PORTABLE    Result Date: 1/15/2025  EXAM: PORTABLE AP CHEST X-RAY INDICATION: altered mental status, COMPARISON: none FINDINGS: There is no focal consolidation, pleural effusion, or pneumothorax. The cardiomediastinal silhouette is normal. The visible bony thorax is intact.     No acute pulmonary disease. Electronically signed by Emmanuel Church      Electronically signed by Marie Mei MD on 1/16/2025 at 11:44 AM

## 2025-01-22 NOTE — DISCHARGE SUMMARY
cardiomediastinal silhouette is normal. The visible bony thorax is intact.     No acute pulmonary disease. Electronically signed by Emmanuel Church      CBC:   Recent Labs     01/20/25  0502   WBC 8.8   HGB 9.4*        BMP:    Recent Labs     01/20/25  0502      K 4.4      CO2 25   BUN 60*   CREATININE 2.5*   GLUCOSE 101*     Hepatic:   Recent Labs     01/20/25  0502   AST 11*   ALT 22   BILITOT 0.3   ALKPHOS 51     Lipids: No results found for: \"CHOL\", \"HDL\", \"TRIG\"  Hemoglobin A1C: No results found for: \"LABA1C\"  TSH:   Lab Results   Component Value Date/Time    TSH 16.00 01/15/2025 11:10 PM     Troponin: No results found for: \"TROPONINT\"  Lactic Acid: No results for input(s): \"LACTA\" in the last 72 hours.  BNP:   Recent Labs     01/20/25  0502   PROBNP 10,571*     UA:  Lab Results   Component Value Date/Time    NITRU Negative 01/15/2025 11:57 PM    COLORU Yellow 01/15/2025 11:57 PM    PHUR 6.0 01/15/2025 11:57 PM    PHUR 6.5 02/06/2013 01:50 PM    WBCUA None seen 01/15/2025 11:57 PM    RBCUA 0-2 01/15/2025 11:57 PM    CLARITYU Clear 01/15/2025 11:57 PM    LEUKOCYTESUR Negative 01/15/2025 11:57 PM    UROBILINOGEN 0.2 01/15/2025 11:57 PM    BILIRUBINUR Negative 01/15/2025 11:57 PM    BLOODU Negative 01/15/2025 11:57 PM    GLUCOSEU Negative 01/15/2025 11:57 PM    KETUA Negative 01/15/2025 11:57 PM     Urine Cultures: No results found for: \"LABURIN\"  Blood Cultures:   Lab Results   Component Value Date/Time    BC No Growth after 4 days of incubation. 01/15/2025 11:57 PM     Lab Results   Component Value Date/Time    BLOODCULT2 No Growth after 4 days of incubation. 01/15/2025 11:57 PM     Organism: No results found for: \"ORG\"    Time Spent Discharging patient 34 minutes    Electronically signed by Emmanuel Lopez MD on 1/22/2025 at 1:12 PM

## 2025-01-22 NOTE — DISCHARGE INSTR - COC
Continuity of Care Form    Patient Name: John Berg   :  1932  MRN:  [id number]    Admit date:  1/15/2025  Discharge date:  25    Code Status Order: DNR-CC   Advance Directives:   Advance Care Flowsheet Documentation             Admitting Physician:  John Henriquez MD  PCP: Taurus Washington MD    Discharging Nurse: MANUEL Whitten  Discharging Hospital Unit/Room#: 4311/4311-01  Discharging Unit Phone Number: [phone]    Emergency Contact:   Extended Emergency Contact Information  Primary Emergency Contact: Wendy Berg  Address: [address]           [place] [address] [facility]  Home Phone: [phone]  Relation: Spouse  Secondary Emergency Contact: Citlali Reinoso  Mobile Phone: [phone]  Relation: Child   needed? No    Past Surgical History:  Past Surgical History:   Procedure Laterality Date    CATARACT REMOVAL  2010    bilateral/Dr. Cosmo Franco    COLONOSCOPY      CYST REMOVAL      knee    JOINT REPLACEMENT      right knee    KNEE ARTHROSCOPY      right    PRE-MALIGNANT / BENIGN SKIN LESION EXCISION  2010    benign pilomatrixoma- chest wall       Immunization History:   Immunization History   Administered Date(s) Administered    COVID-19, MODERNA, (age 12y+), IM, 50mcg/0.5mL 2023    COVID-19, PFIZER Bivalent, DO NOT Dilute, (age 12y+), IM, 30 mcg/0.3 mL 10/25/2022    Influenza 2011    Influenza, FLUZONE High Dose, (age 65 y+), IM, Trivalent PF, 0.5mL 2012, 2013, 2014, 2015    Td, unspecified formulation 2011       Active Problems:  Patient Active Problem List   Diagnosis Code    Hypertension I10    Hyperlipidemia E78.5    Knee pain, right M25.561    Left knee pain M25.562    Anemia D64.9    Neck pain M54.2    Cellulitis of head except face L03.811    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

## 2025-01-22 NOTE — CARE COORDINATION
Case Management Assessment            Discharge Note                    Date / Time of Note: 1/22/2025 2:31 PM                  Discharge Note Completed by: Panchito Duran RN    Patient Name: John Berg   YOB: 1932  Diagnosis: Hyponatremia [E87.1]  Myxedema [E03.9]  Hypothyroid coma (HCC) [E03.5]  Elevated TSH [R79.89]  Hypothermia, initial encounter [T68.XXXA]   Date / Time: 1/15/2025 10:53 PM    Current PCP: Taurus Washington MD  Clinic patient: No    Hospitalization in the last 30 days: No       Advance Directives:  Code Status: DNR-CC  [place] DNR form completed and on chart: Yes    Financial:  Payor: MARIE BANKS MEDICARE / Plan: PAULA BANKS [place] MEDICARE / Product Type: *No Product type* /      Pharmacy:    [facility] [id number] [place], [place] - [address] - P [phone] - F [phone]  [id number]  [place] [address]  Phone: [phone] Fax: [phone]      Assistance purchasing medications?:    Assistance provided by Case Management: None at this time    Does patient want to participate in local refill/ meds to beds program?: No    Meds To Beds General Rules:  1. Can ONLY be done Monday- Friday between 8:30am-5pm  2. Prescription(s) must be in pharmacy by 3pm to be filled same day  3.Copy of patient's insurance/ prescription drug card and patient face sheet must be sent along with the prescription(s)  4. Cost of Rx cannot be added to hospital bill. If financial assistance is needed, please contact unit  or ;  or  CANNOT provide pharmacy voucher for patients co-pays  5. Patients can then  the prescription on their way out of the hospital at discharge, or pharmacy can deliver to the bedside if staff is available. (payment due at time of pick-up or delivery - cash, check, or card accepted)     Able to afford home medications/ co-pay costs: Yes    ADLS:  Current PT AM-PAC Score: 14 /24  Current OT AM-PAC Score: 15 
CM sent referral to [facility] yesterday for LTC placement. Called [facility] admissions and the VM was full. After review and if they accept patient, the family/patient has to enter into a LTC contract which is not covered by medicare insurance. CM will continue to follow patient until discharge. Electronically signed by Tana Rizzo RN on 1/18/2025 at 3:14 PM    
Case Management Assessment  Initial Evaluation    Date/Time of Evaluation: 1/17/2025 5:39 PM  Assessment Completed by: Salena Higgins    If patient is discharged prior to next notation, then this note serves as note for discharge by case management.    Patient Name: John Berg                   YOB: 1932  Diagnosis: Hyponatremia [E87.1]  Myxedema [E03.9]  Hypothyroid coma (HCC) [E03.5]  Elevated TSH [R79.89]  Hypothermia, initial encounter [T68.XXXA]                   Date / Time: 1/15/2025 10:53 PM    Patient Admission Status: Inpatient   Readmission Risk (Low < 19, Mod (19-27), High > 27): Readmission Risk Score: 17.1    Current PCP: Taurus Washington MD  PCP verified by CM? No    Chart Reviewed: Yes      History Provided by: Medical Record, Child/Family  Patient Orientation: Unable to Assess    Patient Cognition: Severely Impaired    Hospitalization in the last 30 days (Readmission):  No    If yes, Readmission Assessment in CM Navigator will be completed.    Advance Directives:      Code Status: DNR-CC   Patient's Primary Decision Maker is: Named in Scanned ACP Document    Primary Decision Maker: Wendy Berg - Spouse - [phone]    Secondary Decision Maker: Citlali Reinoso - Child - [phone]    Discharge Planning:    Patient lives with:   Type of Home:    Primary Care Giver: Self  Patient Support Systems include: Family Members   Current Financial resources: Medicare  Current community resources: Assisted Living  Current services prior to admission:              Current DME:              Type of Home Care services:       ADLS  Prior functional level: Assistance with the following:, Bathing, Dressing, Toileting, Cooking, Housework, Shopping, Mobility  Current functional level: Other (see comment) (tbd)    PT AM-PAC:   /24  OT AM-PAC:   /24    Family can provide assistance at DC: No  Would you like Case Management to discuss the discharge plan with any other family members/significant 
DISCHARGE PLANNIN  Chart reviewed.  Patient is from [place] with is spouse (who has dementia) as an Assisted Living resident.    CM team spoke with Palliative Care team and daughter on Friday. A referral was sent to [facility] for LTC with plans to pay out of pocket since patient does not have a LTC payor. She was also interested in hospice so a referral was sent to \[facility]\"".    I received a voicemail this morning from More at [facility] who stated they currently do not have LTC beds available. CM team will reach out to daughter to get more choices.  I spoke with Lashaun at [facility] ([phone]) and updated her on the above. She is aware that patient may also be appropriate for IPU. She will attempt to reach out to the daughter today to schedule a meeting time. She has my callback number for updates.    UPDATE: 1833  This CM spoke with Evangelina with Palliative Care. She spoke with patients daughter regarding the above. She would like a referral sent to the following, sent via [employer]:  2) [facility]  3) [facility]  4) [facility]    Patients daughter is not interested in IPU at this time.    UPDATE: 9132  I received a voicemail from Lashaun at [facility] who stated she spoke with patients daughter who declined meeting with them at this time. She has their contact information if that decision changes.    UPDATE: 8170  2) [facility] -- They have 1 private room available. The price is $395 per day. Family would need a months payment up front. They are in contract with [facility]  3) [facility] -- They have 1 private room and 1 semi-private room available. The price for the private is $360 per day vs semi-private for $330 per day. Family would need a months payment up front. They are only in contract with [facility].  4) [facility] -- VM left with admissions with my callback number.    UPDATE: 2166  This CM met with patients daughter and son in law along with 
HOC closer to discharge to contact Citlali for OP follow up.  Hopeful that patient can discharge tomorrow.  Malik is aware that family would like to go with HOC at discharge.    CM team will continue to follow.  Eri White RN Case Manager  [phone]

## 2025-01-22 NOTE — PLAN OF CARE
Problem: Discharge Planning  Goal: Discharge to home or other facility with appropriate resources  1/16/2025 1851 by Maulik Portillo, RN  Outcome: Progressing  Flowsheets (Taken 1/16/2025 1851)  Discharge to home or other facility with appropriate resources:   Identify barriers to discharge with patient and caregiver   Arrange for needed discharge resources and transportation as appropriate   Identify discharge learning needs (meds, wound care, etc)   Arrange for interpreters to assist at discharge as needed   Refer to discharge planning if patient needs post-hospital services based on physician order or complex needs related to functional status, cognitive ability or social support system  Note: Barriers to discharge identified and addressed. Learning needs and resources identified. Discharge planning updated according to plan of care     Problem: Safety - Adult  Goal: Free from fall injury  1/16/2025 1851 by Maulik Portillo, RN  Outcome: Progressing  Flowsheets (Taken 1/16/2025 1851)  Free From Fall Injury: Instruct family/caregiver on patient safety  Note: Pt free from injury or falls at this time, fall precautions in place, bed in low position, side rail up x2, [place] Fall Risk: High (45 and higher), bed alarm on, reoriented to room and call light, reminded not to get up without assistance, call light in reach, will continue to monitor. Pt verbalizes understanding of fall risk procedures.       Problem: Skin/Tissue Integrity  Goal: Absence of new skin breakdown  Description: 1.  Monitor for areas of redness and/or skin breakdown  2.  Assess vascular access sites hourly  3.  Every 4-6 hours minimum:  Change oxygen saturation probe site  4.  Every 4-6 hours:  If on nasal continuous positive airway pressure, respiratory therapy assess nares and determine need for appliance change or resting period.  Outcome: Progressing  Note: Pt has scattered bruising, stage 1 to bilat heels, stage 2 on L and R buttocks, and 
  Problem: Discharge Planning  Goal: Discharge to home or other facility with appropriate resources  1/17/2025 0733 by Koki Krueger, RN  Outcome: Progressing  Flowsheets (Taken 1/17/2025 0733)  Discharge to home or other facility with appropriate resources:   Identify barriers to discharge with patient and caregiver   Arrange for needed discharge resources and transportation as appropriate   Identify discharge learning needs (meds, wound care, etc)  Note: Palliative and hospice consulted. Will speak with family today.     Problem: Safety - Adult  Goal: Free from fall injury  1/17/2025 0733 by Koki Krueger, RN  Outcome: Progressing  Flowsheets (Taken 1/17/2025 0733)  Free From Fall Injury: Instruct family/caregiver on patient safety  Note: impulsive       Problem: Skin/Tissue Integrity  Goal: Absence of new skin breakdown  Description: 1.  Monitor for areas of redness and/or skin breakdown  2.  Assess vascular access sites hourly  3.  Every 4-6 hours minimum:  Change oxygen saturation probe site  4.  Every 4-6 hours:  If on nasal continuous positive airway pressure, respiratory therapy assess nares and determine need for appliance change or resting period.  1/17/2025 0733 by Koki Krueger, RN  Outcome: Progressing  Note: Q2h turn    Problem: ABCDS Injury Assessment  Goal: Absence of physical injury  Outcome: Progressing  Flowsheets (Taken 1/17/2025 0733)  Absence of Physical Injury: Implement safety measures based on patient assessment     
  Problem: Discharge Planning  Goal: Discharge to home or other facility with appropriate resources  1/17/2025 1232 by Maulik Portillo, RN  Outcome: Progressing  Flowsheets (Taken 1/17/2025 1232)  Discharge to home or other facility with appropriate resources:   Identify barriers to discharge with patient and caregiver   Arrange for needed discharge resources and transportation as appropriate   Identify discharge learning needs (meds, wound care, etc)   Refer to discharge planning if patient needs post-hospital services based on physician order or complex needs related to functional status, cognitive ability or social support system   Arrange for interpreters to assist at discharge as needed  Note: Barriers to discharge identified and addressed. Learning needs and resources identified. Barriers to discharge identified and discharge planning updated according to care team     Problem: Safety - Adult  Goal: Free from fall injury  1/17/2025 1232 by Maulik Portillo, RN  Outcome: Progressing  Flowsheets (Taken 1/17/2025 1232)  Free From Fall Injury: Instruct family/caregiver on patient safety  Note: Pt free from injury or falls at this time, fall precautions in place, bed in low position, side rail up x2, [place] Fall Risk: High (45 and higher), bed alarm on, reoriented to room and call light, reminded not to get up without assistance, call light in reach, will continue to monitor. Pt verbalizes understanding of fall risk procedures.       Problem: Skin/Tissue Integrity  Goal: Absence of new skin breakdown  Description: 1.  Monitor for areas of redness and/or skin breakdown  2.  Assess vascular access sites hourly  3.  Every 4-6 hours minimum:  Change oxygen saturation probe site  4.  Every 4-6 hours:  If on nasal continuous positive airway pressure, respiratory therapy assess nares and determine need for appliance change or resting period.  1/17/2025 1232 by Maulik Portillo, RN  Outcome: Progressing  Note: Pt has 
  Problem: Discharge Planning  Goal: Discharge to home or other facility with appropriate resources  1/19/2025 1518 by Ifrah Carrasco RN  Outcome: Progressing  Flowsheets (Taken 1/19/2025 1518)  Discharge to home or other facility with appropriate resources:   Identify barriers to discharge with patient and caregiver   Arrange for needed discharge resources and transportation as appropriate   Identify discharge learning needs (meds, wound care, etc)   Arrange for interpreters to assist at discharge as needed   Refer to discharge planning if patient needs post-hospital services based on physician order or complex needs related to functional status, cognitive ability or social support system     Problem: Safety - Adult  Goal: Free from fall injury  1/19/2025 1518 by Ifrah Carrasco RN  Outcome: Progressing  Flowsheets (Taken 1/19/2025 1518)  Free From Fall Injury:   Based on caregiver fall risk screen, instruct family/caregiver to ask for assistance with transferring infant if caregiver noted to have fall risk factors   Instruct family/caregiver on patient safety  Note: Pt in bed with bed alarm on, instructed to call for assistance. Verbalized understanding. All fall precautions in place.        Problem: Skin/Tissue Integrity  Goal: Absence of new skin breakdown  Description: 1.  Monitor for areas of redness and/or skin breakdown  2.  Assess vascular access sites hourly  3.  Every 4-6 hours minimum:  Change oxygen saturation probe site  4.  Every 4-6 hours:  If on nasal continuous positive airway pressure, respiratory therapy assess nares and determine need for appliance change or resting period.  1/19/2025 1518 by Ifrah Carrasco RN  Outcome: Progressing  Note: Pt to remain free of skin breakdown during stay, pt turned and reposition frequently. No evidence of skin breakdown noted.        Problem: ABCDS Injury Assessment  Goal: Absence of physical injury  1/19/2025 0300 by Jose Reddy RN  Outcome: 
  Problem: Discharge Planning  Goal: Discharge to home or other facility with appropriate resources  1/20/2025 0127 by Jose Reddy RN  Outcome: Progressing  Flowsheets (Taken 1/20/2025 0127)  Discharge to home or other facility with appropriate resources:   Identify barriers to discharge with patient and caregiver   Arrange for needed discharge resources and transportation as appropriate   Identify discharge learning needs (meds, wound care, etc)     Problem: Safety - Adult  Goal: Free from fall injury  1/20/2025 0127 by Jose Reddy RN  Outcome: Progressing  Flowsheets (Taken 1/20/2025 0127)  Free From Fall Injury: Instruct family/caregiver on patient safety     Problem: Skin/Tissue Integrity  Goal: Absence of new skin breakdown  Description: 1.  Monitor for areas of redness and/or skin breakdown  2.  Assess vascular access sites hourly  3.  Every 4-6 hours minimum:  Change oxygen saturation probe site  4.  Every 4-6 hours:  If on nasal continuous positive airway pressure, respiratory therapy assess nares and determine need for appliance change or resting period.  1/19/2025 1518 by Ifrah Carrasco RN  Outcome: Progressing  Note: Pt to remain free of skin breakdown during stay, pt turned and reposition frequently. No evidence of skin breakdown noted.        Problem: ABCDS Injury Assessment  Goal: Absence of physical injury  Outcome: Progressing  Flowsheets (Taken 1/20/2025 0127)  Absence of Physical Injury: Implement safety measures based on patient assessment     Problem: Nutrition Deficit:  Goal: Optimize nutritional status  1/20/2025 0127 by Jose Reddy RN  Outcome: Progressing  Flowsheets (Taken 1/20/2025 0127)  Nutrient intake appropriate for improving, restoring, or maintaining nutritional needs:   Assess nutritional status and recommend course of action   Monitor oral intake, labs, and treatment plans     
  Problem: Discharge Planning  Goal: Discharge to home or other facility with appropriate resources  1/20/2025 0907 by Cindy Hollis, RN  Outcome: Progressing  Flowsheets (Taken 1/20/2025 0907)  Discharge to home or other facility with appropriate resources:   Identify barriers to discharge with patient and caregiver   Arrange for needed discharge resources and transportation as appropriate   Identify discharge learning needs (meds, wound care, etc)  Note: V/S stable.  Pressures soft.  Pt in sinus arrythmia w/ PACs and PVCs currently.     Problem: Safety - Adult  Goal: Free from fall injury  1/20/2025 0907 by Cindy Hollis, RN  Outcome: Progressing  Flowsheets (Taken 1/20/2025 0907)  Free From Fall Injury: Instruct family/caregiver on patient safety  Note: Pt is a Fall Risk. See [place] Fall Risk Score. Pt bed in low position and side rails up. Call light and belongings in reach. Pt encouraged to call for assistance. Will continue with hourly rounds for PO intake, pain needs, toileting, and repositioning as needed.         Problem: Skin/Tissue Integrity  Goal: Absence of new skin breakdown  Description: 1.  Monitor for areas of redness and/or skin breakdown  2.  Assess vascular access sites hourly  3.  Every 4-6 hours minimum:  Change oxygen saturation probe site  4.  Every 4-6 hours:  If on nasal continuous positive airway pressure, respiratory therapy assess nares and determine need for appliance change or resting period.  Outcome: Progressing  Note: Pt turned Q2 and PRN. Medications applied per order.  Mepilex on heels, coccyx and right arm.  No new signs of skin breakdown seen.     Problem: Confusion  Goal: Confusion, delirium, dementia, or psychosis is improved or at baseline  Description: INTERVENTIONS:  1. Assess for possible contributors to thought disturbance, including medications, impaired vision or hearing, underlying metabolic abnormalities, dehydration, psychiatric diagnoses, and notify attending 
  Problem: Discharge Planning  Goal: Discharge to home or other facility with appropriate resources  1/21/2025 0956 by Makayla De Jesus RN  Outcome: Progressing  Flowsheets (Taken 1/21/2025 0956)  Discharge to home or other facility with appropriate resources: Identify barriers to discharge with patient and caregiver     Problem: Safety - Adult  Goal: Free from fall injury  1/21/2025 0956 by Makayla De Jesus RN  Outcome: Progressing  Flowsheets (Taken 1/21/2025 0956)  Free From Fall Injury: Instruct family/caregiver on patient safety     Problem: Skin/Tissue Integrity  Goal: Absence of new skin breakdown  Description: 1.  Monitor for areas of redness and/or skin breakdown  2.  Assess vascular access sites hourly  3.  Every 4-6 hours minimum:  Change oxygen saturation probe site  4.  Every 4-6 hours:  If on nasal continuous positive airway pressure, respiratory therapy assess nares and determine need for appliance change or resting period.  1/21/2025 0956 by Makayla De Jesus RN  Outcome: Progressing     Problem: ABCDS Injury Assessment  Goal: Absence of physical injury  1/21/2025 0956 by Makayla De Jesus RN  Outcome: Progressing  Flowsheets (Taken 1/21/2025 0956)  Absence of Physical Injury: Implement safety measures based on patient assessment     Problem: Confusion  Goal: Confusion, delirium, dementia, or psychosis is improved or at baseline  Description: INTERVENTIONS:  1. Assess for possible contributors to thought disturbance, including medications, impaired vision or hearing, underlying metabolic abnormalities, dehydration, psychiatric diagnoses, and notify attending LIP  2. [place] high risk fall precautions, as indicated  3. Provide frequent short contacts to provide reality reorientation, refocusing and direction  4. Decrease environmental stimuli, including noise as appropriate  5. Monitor and intervene to maintain adequate nutrition, hydration, elimination, sleep and activity  6. If unable to ensure safety without 
  Problem: Discharge Planning  Goal: Discharge to home or other facility with appropriate resources  Outcome: Progressing     Problem: Safety - Adult  Goal: Free from fall injury  Outcome: Progressing     Problem: Skin/Tissue Integrity  Goal: Absence of new skin breakdown  Description: 1.  Monitor for areas of redness and/or skin breakdown  2.  Assess vascular access sites hourly  3.  Every 4-6 hours minimum:  Change oxygen saturation probe site  4.  Every 4-6 hours:  If on nasal continuous positive airway pressure, respiratory therapy assess nares and determine need for appliance change or resting period.  Outcome: Progressing     Problem: ABCDS Injury Assessment  Goal: Absence of physical injury  Outcome: Progressing     Problem: Confusion  Goal: Confusion, delirium, dementia, or psychosis is improved or at baseline  Description: INTERVENTIONS:  1. Assess for possible contributors to thought disturbance, including medications, impaired vision or hearing, underlying metabolic abnormalities, dehydration, psychiatric diagnoses, and notify attending LIP  2. [place] high risk fall precautions, as indicated  3. Provide frequent short contacts to provide reality reorientation, refocusing and direction  4. Decrease environmental stimuli, including noise as appropriate  5. Monitor and intervene to maintain adequate nutrition, hydration, elimination, sleep and activity  6. If unable to ensure safety without constant attention obtain sitter and review sitter guidelines with assigned personnel  7. Initiate Psychosocial CNS and Spiritual Care consult, as indicated  Outcome: Progressing     Problem: Nutrition Deficit:  Goal: Optimize nutritional status  Outcome: Progressing     Problem: Chronic Conditions and Co-morbidities  Goal: Patient's chronic conditions and co-morbidity symptoms are monitored and maintained or improved  Outcome: Progressing     
  Problem: Discharge Planning  Goal: Discharge to home or other facility with appropriate resources  Outcome: Progressing  Flowsheets (Taken 1/16/2025 0618)  Discharge to home or other facility with appropriate resources:   Identify barriers to discharge with patient and caregiver   Arrange for needed discharge resources and transportation as appropriate   Identify discharge learning needs (meds, wound care, etc)  Note: Improve HR, body temperature, and BP prior to discharge     Problem: Safety - Adult  Goal: Free from fall injury  Outcome: Progressing  Flowsheets (Taken 1/16/2025 0618)  Free From Fall Injury: Instruct family/caregiver on patient safety  Note: High fall risk. Confused. Impulsive at times     
  Problem: Discharge Planning  Goal: Discharge to home or other facility with appropriate resources  Outcome: Progressing  Flowsheets (Taken 1/18/2025 0550)  Discharge to home or other facility with appropriate resources:   Identify barriers to discharge with patient and caregiver   Arrange for needed discharge resources and transportation as appropriate   Identify discharge learning needs (meds, wound care, etc)     Problem: Safety - Adult  Goal: Free from fall injury  Outcome: Progressing  Flowsheets (Taken 1/18/2025 0550)  Free From Fall Injury: Instruct family/caregiver on patient safety     Problem: Skin/Tissue Integrity  Goal: Absence of new skin breakdown  Description: 1.  Monitor for areas of redness and/or skin breakdown  2.  Assess vascular access sites hourly  3.  Every 4-6 hours minimum:  Change oxygen saturation probe site  4.  Every 4-6 hours:  If on nasal continuous positive airway pressure, respiratory therapy assess nares and determine need for appliance change or resting period.  Outcome: Progressing     Problem: ABCDS Injury Assessment  Goal: Absence of physical injury  Outcome: Progressing  Flowsheets (Taken 1/18/2025 0550)  Absence of Physical Injury: Implement safety measures based on patient assessment     
  Problem: Discharge Planning  Goal: Discharge to home or other facility with appropriate resources  Outcome: Progressing  Flowsheets (Taken 1/19/2025 0300)  Discharge to home or other facility with appropriate resources:   Identify barriers to discharge with patient and caregiver   Arrange for needed discharge resources and transportation as appropriate     Problem: Safety - Adult  Goal: Free from fall injury  Outcome: Progressing  Flowsheets (Taken 1/19/2025 0300)  Free From Fall Injury:   Instruct family/caregiver on patient safety   Based on caregiver fall risk screen, instruct family/caregiver to ask for assistance with transferring infant if caregiver noted to have fall risk factors     Problem: Skin/Tissue Integrity  Goal: Absence of new skin breakdown  Description: 1.  Monitor for areas of redness and/or skin breakdown  2.  Assess vascular access sites hourly  3.  Every 4-6 hours minimum:  Change oxygen saturation probe site  4.  Every 4-6 hours:  If on nasal continuous positive airway pressure, respiratory therapy assess nares and determine need for appliance change or resting period.  Outcome: Progressing     Problem: ABCDS Injury Assessment  Goal: Absence of physical injury  Outcome: Progressing  Flowsheets (Taken 1/19/2025 0300)  Absence of Physical Injury: Implement safety measures based on patient assessment     Problem: Confusion  Goal: Confusion, delirium, dementia, or psychosis is improved or at baseline  Description: INTERVENTIONS:  1. Assess for possible contributors to thought disturbance, including medications, impaired vision or hearing, underlying metabolic abnormalities, dehydration, psychiatric diagnoses, and notify attending LIP  2. [place] high risk fall precautions, as indicated  3. Provide frequent short contacts to provide reality reorientation, refocusing and direction  4. Decrease environmental stimuli, including noise as appropriate  5. Monitor and intervene to maintain adequate 
  Problem: Discharge Planning  Goal: Discharge to home or other facility with appropriate resources  Outcome: Progressing  Flowsheets (Taken 1/22/2025 0942)  Discharge to home or other facility with appropriate resources: Identify barriers to discharge with patient and caregiver     Problem: Safety - Adult  Goal: Free from fall injury  Outcome: Progressing  Flowsheets (Taken 1/22/2025 0942)  Free From Fall Injury: Instruct family/caregiver on patient safety  Note: Patient in lowest position, bed breaks locked, bed alarm in placed, call light within reach, and encouraged to call for assistance.      Problem: Skin/Tissue Integrity  Goal: Absence of new skin breakdown  Description: 1.  Monitor for areas of redness and/or skin breakdown  2.  Assess vascular access sites hourly  3.  Every 4-6 hours minimum:  Change oxygen saturation probe site  4.  Every 4-6 hours:  If on nasal continuous positive airway pressure, respiratory therapy assess nares and determine need for appliance change or resting period.  Outcome: Progressing  Note: Q2 turns, sacral heart in placed     Problem: ABCDS Injury Assessment  Goal: Absence of physical injury  Outcome: Progressing  Flowsheets (Taken 1/22/2025 0942)  Absence of Physical Injury: Implement safety measures based on patient assessment     Problem: Nutrition Deficit:  Goal: Optimize nutritional status  Outcome: Progressing  Flowsheets (Taken 1/22/2025 0942)  Nutrient intake appropriate for improving, restoring, or maintaining nutritional needs:   Assess nutritional status and recommend course of action   Monitor oral intake, labs, and treatment plans     Problem: Chronic Conditions and Co-morbidities  Goal: Patient's chronic conditions and co-morbidity symptoms are monitored and maintained or improved  Outcome: Progressing  Flowsheets (Taken 1/22/2025 0942)  Care Plan - Patient's Chronic Conditions and Co-Morbidity Symptoms are Monitored and Maintained or Improved: Monitor and assess 
  Problem: SLP Adult - Impaired Swallowing  Goal: By Discharge: Advance to least restrictive diet without signs or symptoms of aspiration for planned discharge setting.  See evaluation for individualized goals.  Outcome: Progressing     
[facility] Hospitalist brief note  Consult received.  Case reviewed with ER physician- admit for hypothyroidism, TE    Full note to follow.    Taurus Washington MD    Thanks  DARVIN STACK MD  
Maulik Portillo, RN  Outcome: Progressing  Note: Pt has scattered bruising and skin breakdown on heels and coccyx. Pt turned Q2 and repositioned for comfort. Mepilex foam used on sacrum. Pt kept clean and dry to prevent further breakdown. Wound care provided with pharmaceutical cream       Problem: Confusion  Goal: Confusion, delirium, dementia, or psychosis is improved or at baseline  Description: INTERVENTIONS:  1. Assess for possible contributors to thought disturbance, including medications, impaired vision or hearing, underlying metabolic abnormalities, dehydration, psychiatric diagnoses, and notify attending LIP  2. [place] high risk fall precautions, as indicated  3. Provide frequent short contacts to provide reality reorientation, refocusing and direction  4. Decrease environmental stimuli, including noise as appropriate  5. Monitor and intervene to maintain adequate nutrition, hydration, elimination, sleep and activity  6. If unable to ensure safety without constant attention obtain sitter and review sitter guidelines with assigned personnel  7. Initiate Psychosocial CNS and Spiritual Care consult, as indicated  Outcome: Progressing  Flowsheets (Taken 1/18/2025 1253)  Effect of thought disturbance (confusion, delirium, dementia, or psychosis) are managed with adequate functional status:   Assess for contributors to thought disturbance, including medications, impaired vision or hearing, underlying metabolic abnormalities, dehydration, psychiatric diagnoses, notify LIP   [place] high risk fall precautions, as indicated   Provide frequent short contacts to provide reality reorientation, refocusing and direction   Decrease environmental stimuli, including noise as appropriate   Monitor and intervene to maintain adequate nutrition, hydration, elimination, sleep and activity   If unable to ensure safety without constant attention obtain sitter and review sitter guidelines with assigned personnel   Initiate